# Patient Record
Sex: FEMALE | Race: WHITE | Employment: FULL TIME | URBAN - METROPOLITAN AREA
[De-identification: names, ages, dates, MRNs, and addresses within clinical notes are randomized per-mention and may not be internally consistent; named-entity substitution may affect disease eponyms.]

---

## 2017-11-20 LAB — HIV AG/AB: NONREACTIVE

## 2020-01-16 ENCOUNTER — OFFICE VISIT (OUTPATIENT)
Dept: INTERNAL MEDICINE CLINIC | Age: 35
End: 2020-01-16
Payer: COMMERCIAL

## 2020-01-16 VITALS
BODY MASS INDEX: 23.16 KG/M2 | RESPIRATION RATE: 14 BRPM | SYSTOLIC BLOOD PRESSURE: 110 MMHG | WEIGHT: 118 LBS | OXYGEN SATURATION: 98 % | HEART RATE: 99 BPM | DIASTOLIC BLOOD PRESSURE: 80 MMHG | HEIGHT: 60 IN

## 2020-01-16 PROCEDURE — 99385 PREV VISIT NEW AGE 18-39: CPT | Performed by: INTERNAL MEDICINE

## 2020-01-16 RX ORDER — NORGESTREL AND ETHINYL ESTRADIOL 0.3-0.03MG
1 KIT ORAL DAILY
COMMUNITY
Start: 2019-11-15 | End: 2021-04-05

## 2020-01-16 SDOH — HEALTH STABILITY: MENTAL HEALTH: HOW MANY STANDARD DRINKS CONTAINING ALCOHOL DO YOU HAVE ON A TYPICAL DAY?: 1 OR 2

## 2020-01-16 SDOH — HEALTH STABILITY: MENTAL HEALTH: HOW OFTEN DO YOU HAVE A DRINK CONTAINING ALCOHOL?: 2-3 TIMES A WEEK

## 2020-01-16 ASSESSMENT — ENCOUNTER SYMPTOMS
PHOTOPHOBIA: 0
VOMITING: 0
TROUBLE SWALLOWING: 0
NAUSEA: 0
SINUS PAIN: 0
BACK PAIN: 0
COLOR CHANGE: 0
SHORTNESS OF BREATH: 0
SINUS PRESSURE: 0
DIARRHEA: 0
EYE PAIN: 0
ABDOMINAL PAIN: 0
COUGH: 0
WHEEZING: 0
CONSTIPATION: 0

## 2020-01-16 ASSESSMENT — PATIENT HEALTH QUESTIONNAIRE - PHQ9
SUM OF ALL RESPONSES TO PHQ9 QUESTIONS 1 & 2: 0
SUM OF ALL RESPONSES TO PHQ QUESTIONS 1-9: 0
1. LITTLE INTEREST OR PLEASURE IN DOING THINGS: 0
2. FEELING DOWN, DEPRESSED OR HOPELESS: 0
SUM OF ALL RESPONSES TO PHQ QUESTIONS 1-9: 0

## 2020-01-16 NOTE — PROGRESS NOTES
History and Physical      Edel Mccallum  YOB: 1985    Date of Service:  1/16/2020    Vitals:    01/16/20 0811   BP: 110/80   Pulse: 99   Resp: 14   SpO2: 98%   Weight: 118 lb (53.5 kg)   Height: 5' 0.25\" (1.53 m)     Body mass index is 22.85 kg/m². Wt Readings from Last 3 Encounters:   01/16/20 118 lb (53.5 kg)     BP Readings from Last 3 Encounters:   01/16/20 110/80       Chief Complaint: Aixa Ornelas is a 29 y.o. female who presents for complete physical examination. HPI:     Feeling well  [employer] employee  Some depression last year when weaning her daughter - has significantly improved now. Did therapy. Has had thrombocytopenia of pregnancy. Did not improve postpartum this last time. There is no problem list on file for this patient. No Known Allergies  Outpatient Medications Marked as Taking for the 1/16/20 encounter (Office Visit) with Lana Martinez MD   Medication Sig Dispense Refill    LOW-OGESTREL 0.3-30 MG-MCG per tablet Take 1 tablet by mouth daily         Past Medical History:   Diagnosis Date    Chicken pox      History reviewed. No pertinent surgical history. History reviewed. No pertinent family history. Review of Systems:  Review of Systems   Constitutional: Negative for appetite change, fatigue, fever and unexpected weight change. No night sweats   HENT: Negative for congestion, ear pain, hearing loss, nosebleeds, sinus pressure, sinus pain, sneezing, tinnitus and trouble swallowing. Eyes: Negative for photophobia, pain and visual disturbance. Respiratory: Negative for cough, shortness of breath and wheezing. Cardiovascular: Negative for chest pain, palpitations and leg swelling. Gastrointestinal: Negative for abdominal pain, constipation, diarrhea, nausea and vomiting. Endocrine: Negative for cold intolerance, heat intolerance, polydipsia, polyphagia and polyuria.    Genitourinary: Negative for difficulty urinating, dysuria, frequency, hematuria and urgency. Musculoskeletal: Negative for arthralgias, back pain, joint swelling, myalgias and neck pain. Skin: Negative for color change and rash. Allergic/Immunologic: Negative for environmental allergies, food allergies and immunocompromised state. Neurological: Negative for dizziness, syncope, speech difficulty, weakness, light-headedness, numbness and headaches. Hematological: Negative for adenopathy. Does not bruise/bleed easily. Psychiatric/Behavioral: Negative for dysphoric mood and sleep disturbance. The patient is not nervous/anxious. Physical Exam  Constitutional:       General: She is not in acute distress. Appearance: Normal appearance. She is well-developed. She is not diaphoretic. HENT:      Head: Normocephalic and atraumatic. Right Ear: Tympanic membrane, ear canal and external ear normal.      Left Ear: Tympanic membrane, ear canal and external ear normal.      Nose: Nose normal.      Mouth/Throat:      Mouth: Mucous membranes are moist.      Pharynx: Oropharynx is clear. No oropharyngeal exudate or posterior oropharyngeal erythema. Eyes:      General: No scleral icterus. Conjunctiva/sclera: Conjunctivae normal.      Pupils: Pupils are equal, round, and reactive to light. Neck:      Musculoskeletal: Normal range of motion and neck supple. Thyroid: No thyromegaly. Vascular: No JVD. Comments: No carotid bruits  Cardiovascular:      Rate and Rhythm: Normal rate and regular rhythm. Pulses: Normal pulses. Heart sounds: Normal heart sounds. No murmur. No friction rub. No gallop. Pulmonary:      Effort: Pulmonary effort is normal.      Breath sounds: Normal breath sounds. No wheezing or rales. Abdominal:      General: Bowel sounds are normal.      Palpations: Abdomen is soft. Musculoskeletal: Normal range of motion. General: No tenderness or deformity. Right lower leg: No edema.       Left lower leg: No edema. Lymphadenopathy:      Cervical: No cervical adenopathy. Skin:     General: Skin is warm and dry. Findings: No lesion or rash. Neurological:      General: No focal deficit present. Mental Status: She is alert and oriented to person, place, and time. Coordination: Coordination normal.   Psychiatric:         Mood and Affect: Mood normal.         Behavior: Behavior normal.         Lipid panel: No results found for: CHOL, TRIG, HDL, LDLCALC, LDLDIRECT  Glucose: No results found for: GLUCOSE    No results found for this visit on 01/16/20.     Preventive Care:  Hx abnormal PAP: no  Self-breast exams: yes  Eye exam within the past 2 years: yes  Dental exam every 6 months: yes  Seatbelt used consistently: yes  Smoke and carbon monoxidedetectors in home: yes   Exercise: using a n exercise physical therapist  Social History     Socioeconomic History    Marital status: None     Spouse name: None    Number of children: None    Years of education: None    Highest education level: None   Occupational History    Occupation: works for [employer] resource strain: None    Food insecurity:     Worry: None     Inability: None    Transportation needs:     Medical: None     Non-medical: None   Tobacco Use    Smoking status: Never Smoker    Smokeless tobacco: Never Used   Substance and Sexual Activity    Alcohol use: Yes     Frequency: 2-3 times a week     Drinks per session: 1 or 2    Drug use: None    Sexual activity: None   Lifestyle    Physical activity:     Days per week: None     Minutes per session: None    Stress: None   Relationships    Social connections:     Talks on phone: None     Gets together: None     Attends [employer] service: None     Active member of club or organization: None     Attends meetings of clubs or organizations: None     Relationship status: None    Intimate partner violence:     Fear of current or ex partner: None     Emotionally abused: None     Physically abused: None     Forced sexual activity: None   Other Topics Concern    None   Social History Narrative    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 Diagnoses and all orders for this visit:    Annual physical exam  Encourage continued exercise and healthy diet  UTD on IMZ  UTD on cancer screenings  Screen for lipids and DM. Check platelets for stability    Screening for diabetes mellitus (DM)  -     Hemoglobin A1C; Future    Encounter for lipid screening for cardiovascular disease  -     Lipid Panel;  Future    Thrombocytopenia (HCC)  -     CBC Auto Differential; Future          Return in about 1 year (around 1/16/2021) for Physical.

## 2020-08-04 ENCOUNTER — EMPLOYEE WELLNESS (OUTPATIENT)
Dept: OTHER | Age: 35
End: 2020-08-04

## 2020-08-04 LAB
CHOLESTEROL, TOTAL: 154 MG/DL (ref 0–199)
GLUCOSE BLD-MCNC: 80 MG/DL (ref 70–99)
HDLC SERPL-MCNC: 44 MG/DL (ref 40–60)
LDL CHOLESTEROL CALCULATED: 66 MG/DL
TRIGL SERPL-MCNC: 220 MG/DL (ref 0–150)

## 2020-08-19 ENCOUNTER — NURSE TRIAGE (OUTPATIENT)
Dept: OTHER | Facility: CLINIC | Age: 35
End: 2020-08-19

## 2020-08-19 ENCOUNTER — TELEPHONE (OUTPATIENT)
Dept: INTERNAL MEDICINE CLINIC | Age: 35
End: 2020-08-19

## 2020-08-19 NOTE — TELEPHONE ENCOUNTER
Reason for Disposition   MILD rectal bleeding (more than just a few drops or streaks)    Answer Assessment - Initial Assessment Questions  1. APPEARANCE of BLOOD: \"What color is it? \" \"Is it passed separately, on the surface of the stool, or mixed in with the stool? \"       Blood when whiping  2. AMOUNT: \"How much blood was passed? \"       small  3. FREQUENCY: \"How many times has blood been passed with the stools? \"   When constipated  4. ONSET: \"When was the blood first seen in the stools? \" (Days or weeks)       Since March  5. DIARRHEA: \"Is there also some diarrhea? \" If so, ask: \"How many diarrhea no  6. CONSTIPATION: \"Do you have constipation? \" If so, \"How bad is it? \"  yes  7. RECURRENT SYMPTOMS: \"Have you had blood in your stools before? \" If so, ask: \"When was the last time? \" and \"What happened that time? \"      yes  8. BLOOD THINNERS: \"Do you take any blood thinners? \" (e.g., Coumadin/warfarin, Pradaxa/dabigatran, aspirin)   no  9. OTHER SYMPTOMS: \"Do you have any other symptoms? \"  (e.g., abdominal pain, vomiting, dizziness, fever)  Bloating, abd pressure  10. PREGNANCY: \"Is there any chance you are pregnant? \" \"When was your last menstrual period? \"    No, cycle this week    Protocols used: RECTAL BLEEDING-ADULT-[place]    Call transferred to [facility]

## 2020-08-19 NOTE — TELEPHONE ENCOUNTER
----- Message from Ozie Cushing sent at 8/19/2020 12:36 PM EDT -----  Subject: Message to Provider    QUESTIONS  Information for Provider? Pt return from triage; to be seen in next 3   days; abdominal bleeding   blood in stool; Prefers early morning 8/20 or around lunch time 8/21  ---------------------------------------------------------------------------  --------------  CALL BACK INFO  What is the best way for the office to contact you? OK to leave message on   voicemail  Preferred Call Back Phone Number? [phone]  ---------------------------------------------------------------------------  --------------  SCRIPT ANSWERS  Relationship to Patient?  Self

## 2020-08-19 NOTE — TELEPHONE ENCOUNTER
Reason for Disposition   Caller has already spoken with another triager and has no further questions    Protocols used: NO CONTACT OR DUPLICATE CONTACT CALL-ADULT-[place]    Disconnected from patient - patient called back and spoke with a different Triage RN.

## 2020-08-19 NOTE — TELEPHONE ENCOUNTER
Pt scheduled. Kellie Tabares spoke with patient who states she has had these sx since March. They come and go. Weird abdominal pain ok with waiting till first available with Dr Giovanna Thurman which is Monday.  Patient scheduled

## 2020-08-19 NOTE — TELEPHONE ENCOUNTER
Left pt message to call back. Need additional information. Blood when wiping? Blood in stool or toilet? How long? Any pain when having bowel movement? Abdominal pain? Where is it located? Constipation? Bright blood or dark red?

## 2020-08-24 ENCOUNTER — OFFICE VISIT (OUTPATIENT)
Dept: INTERNAL MEDICINE CLINIC | Age: 35
End: 2020-08-24
Payer: COMMERCIAL

## 2020-08-24 VITALS
TEMPERATURE: 98.7 F | RESPIRATION RATE: 14 BRPM | SYSTOLIC BLOOD PRESSURE: 92 MMHG | DIASTOLIC BLOOD PRESSURE: 66 MMHG | HEART RATE: 83 BPM | OXYGEN SATURATION: 98 %

## 2020-08-24 LAB
BILIRUBIN URINE: NEGATIVE
BLOOD, URINE: NEGATIVE
CLARITY: CLEAR
COLOR: YELLOW
CONTROL: NORMAL
EPITHELIAL CELLS, UA: 1 /HPF (ref 0–5)
GLUCOSE URINE: NEGATIVE MG/DL
HYALINE CASTS: 0 /LPF (ref 0–8)
KETONES, URINE: NEGATIVE MG/DL
LEUKOCYTE ESTERASE, URINE: NEGATIVE
MICROSCOPIC EXAMINATION: NORMAL
NITRITE, URINE: NEGATIVE
PH UA: 7.5 (ref 5–8)
PREGNANCY TEST URINE, POC: NEGATIVE
PROTEIN UA: NEGATIVE MG/DL
RBC UA: 1 /HPF (ref 0–4)
SPECIFIC GRAVITY UA: 1.01 (ref 1–1.03)
URINE TYPE: NORMAL
UROBILINOGEN, URINE: 0.2 E.U./DL
WBC UA: 0 /HPF (ref 0–5)

## 2020-08-24 PROCEDURE — G8420 CALC BMI NORM PARAMETERS: HCPCS | Performed by: INTERNAL MEDICINE

## 2020-08-24 PROCEDURE — 99214 OFFICE O/P EST MOD 30 MIN: CPT | Performed by: INTERNAL MEDICINE

## 2020-08-24 PROCEDURE — 1036F TOBACCO NON-USER: CPT | Performed by: INTERNAL MEDICINE

## 2020-08-24 PROCEDURE — 81025 URINE PREGNANCY TEST: CPT | Performed by: INTERNAL MEDICINE

## 2020-08-24 PROCEDURE — G8427 DOCREV CUR MEDS BY ELIG CLIN: HCPCS | Performed by: INTERNAL MEDICINE

## 2020-08-24 ASSESSMENT — ENCOUNTER SYMPTOMS
VOMITING: 0
ABDOMINAL PAIN: 1
BLOOD IN STOOL: 0
NAUSEA: 0
ABDOMINAL DISTENTION: 1
DIARRHEA: 0
CONSTIPATION: 1
RECTAL PAIN: 1
ANAL BLEEDING: 1

## 2020-08-24 NOTE — PROGRESS NOTES
[facility]  Acute Visit  Shima Fajardo MD      Fani Castrejon is a 28 y.o. female who presents with   Chief Complaint   Patient presents with    Abdominal Pain     right sided discomfort, has a hx of Hemorrhoids, bloating feeling in right sided lower abdomin. Only blood when Hemorrhoids are acting up. Under a lot of stress    . HPI    Since March has had intermittent bloating on the right side. Mild discomfort. Notices more when lays down on back or front. 4 kids. Stools are dark brown and happening every 3 days - then will have 3 stools in day. Takes a little bit of fiber supplement each day. Always hemorrhoids - has some redness on toilet paper. Does not think. Have been worsening. Does not take stool softener. Has had negative pregnancy tests as well. Weight has been steady. In July went up on OCP dose because of breakthrough bleeding. No fevers or chills. Feels some suprapubic pressure but no pain. Review of Systems   Constitutional: Negative for chills, fatigue, fever and unexpected weight change. Gastrointestinal: Positive for abdominal distention, abdominal pain, anal bleeding, constipation and rectal pain. Negative for blood in stool, diarrhea, nausea and vomiting. Genitourinary: Positive for menstrual problem (spotting). Skin: Negative for rash. Past Medical History:   Diagnosis Date    Chicken pox        History reviewed. No pertinent surgical history.     Social History     Socioeconomic History    Marital status:      Spouse name: Not on file    Number of children: Not on file    Years of education: Not on file    Highest education level: Not on file   Occupational History    Occupation: works for [employer] Financial resource strain: Not on file    Food insecurity     Worry: Not on file     Inability: Not on file   [employer] needs     Medical: Not on file     Non-medical: Not on file   Tobacco Use    Smoking status: Never Smoker    Smokeless tobacco: Never Used   Substance and Sexual Activity    Alcohol use: Yes     Frequency: 2-3 times a week     Drinks per session: 1 or 2    Drug use: Not on file    Sexual activity: Not on file   Lifestyle    Physical activity     Days per week: Not on file     Minutes per session: Not on file    Stress: Not on file   Relationships    Social connections     Talks on phone: Not on file     Gets together: Not on file     Attends [employer] service: Not on file     Active member of club or organization: Not on file     Attends meetings of clubs or organizations: Not on file     Relationship status: Not on file    Intimate partner violence     Fear of current or ex partner: Not on file     Emotionally abused: Not on file     Physically abused: Not on file     Forced sexual activity: Not on file   Other Topics Concern    Not on file   Social History Narrative    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 No Known Allergies      BP 92/66   Pulse 83   Temp 98.7 °F (37.1 °C) (Temporal)   Resp 14   LMP 08/17/2020   SpO2 98%   Physical Exam  Constitutional:       General: She is not in acute distress. Appearance: Normal appearance. She is not diaphoretic. HENT:      Head: Normocephalic and atraumatic. Right Ear: External ear normal.      Left Ear: External ear normal.      Nose: Nose normal.      Mouth/Throat:      Mouth: Mucous membranes are moist.      Pharynx: Oropharynx is clear. Eyes:      General: No scleral icterus. Conjunctiva/sclera: Conjunctivae normal.   Neck:      Musculoskeletal: Normal range of motion. Cardiovascular:      Rate and Rhythm: Normal rate and regular rhythm. Pulses: Normal pulses.       Heart sounds: Normal heart sounds. No murmur. No friction rub. No gallop. Pulmonary:      Effort: Pulmonary effort is normal.      Breath sounds: Normal breath sounds. No wheezing, rhonchi or rales. Abdominal:      General: Abdomen is flat. Bowel sounds are normal. There is no distension. Palpations: Abdomen is soft. There is no mass. Tenderness: There is abdominal tenderness (mild RLQ). There is no right CVA tenderness, left CVA tenderness, guarding or rebound. Hernia: No hernia is present. Genitourinary:     Comments: 2 internal hemorrhoids at 10 oclock and 12 oclock - no prolapse  No blood noted  Brown stool in rectal vault  Musculoskeletal:         General: No deformity. Right lower leg: No edema. Left lower leg: No edema. Skin:     General: Skin is warm and dry. Findings: No rash. Neurological:      General: No focal deficit present. Mental Status: She is alert. Coordination: Coordination normal.      Gait: Gait normal.   Psychiatric:         Mood and Affect: Mood normal.         Behavior: Behavior normal.           Assessment/Plan:  [facility] was seen today for abdominal pain. Diagnoses and all orders for this visit:    Right lower quadrant abdominal pain  GI vs  vs gyn. Will get HCG, UA/Cx and labs. Given rectal bleeding (likely hemorrhoids but still) will need to proceed with further work up. Start with CT imaging and pending those results consider colonoscopy. Continue fiber supplement. Add stool softener to increase BM frequency. -     POCT urine pregnancy  -     URINALYSIS WITH MICROSCOPIC  -     Culture, Urine  -     CBC Auto Differential; Future  -     COMPREHENSIVE METABOLIC PANEL; Future  -     SEDIMENTATION RATE; Future  -     TSH with Reflex; Future  -     CT ABDOMEN PELVIS W IV CONTRAST Additional Contrast? Radiologist Recommendation; Future  -     C-Reactive Protein;  Future    Rectal bleeding  -     POCT urine pregnancy  -     URINALYSIS WITH MICROSCOPIC  - Culture, Urine  -     CBC Auto Differential; Future  -     COMPREHENSIVE METABOLIC PANEL; Future  -     SEDIMENTATION RATE; Future  -     TSH with Reflex; Future  -     CT ABDOMEN PELVIS W IV CONTRAST Additional Contrast? Radiologist Recommendation; Future  -     C-Reactive Protein; Future          Return if symptoms worsen or fail to improve.

## 2020-08-25 LAB — URINE CULTURE, ROUTINE: NORMAL

## 2020-09-03 ENCOUNTER — HOSPITAL ENCOUNTER (OUTPATIENT)
Dept: CT IMAGING | Age: 35
Discharge: HOME | End: 2020-09-03
Payer: COMMERCIAL

## 2020-09-03 PROCEDURE — 74177 CT ABD & PELVIS W/CONTRAST: CPT

## 2020-09-03 PROCEDURE — 6360000004 HC RX CONTRAST MEDICATION: Performed by: INTERNAL MEDICINE

## 2020-09-03 RX ADMIN — IOPAMIDOL 80 ML: 755 INJECTION, SOLUTION INTRAVENOUS at 10:58

## 2020-09-03 RX ADMIN — IOHEXOL 50 ML: 240 INJECTION, SOLUTION INTRATHECAL; INTRAVASCULAR; INTRAVENOUS; ORAL at 10:58

## 2020-09-30 ENCOUNTER — INITIAL CONSULT (OUTPATIENT)
Dept: GASTROENTEROLOGY | Age: 35
End: 2020-09-30
Payer: COMMERCIAL

## 2020-09-30 VITALS
WEIGHT: 122.2 LBS | SYSTOLIC BLOOD PRESSURE: 120 MMHG | BODY MASS INDEX: 23.99 KG/M2 | DIASTOLIC BLOOD PRESSURE: 91 MMHG | TEMPERATURE: 98.2 F | HEIGHT: 60 IN | HEART RATE: 81 BPM

## 2020-09-30 PROCEDURE — 99202 OFFICE O/P NEW SF 15 MIN: CPT | Performed by: INTERNAL MEDICINE

## 2020-09-30 NOTE — PROGRESS NOTES
[address] [facility],  [address]  Phone: [phone]   ZQY:[phone]        Paz Mccallum She is a  [2] White [1] 28 y.o. Rema  female      [facility]   The patient is here for Bleeding per rectum. Chief Complaint   Patient presents with    New Patient     bloating, ruq discomfort, has hemorroids        HPI      The patient has been diagnosed with irritable bowel syndrome years ago and has been having intermittent abdominal bloating discomfort and recently she has had streaks of blood coating stool from outside. She has not had any fever or diarrhea. She has not noticed any significant change in her bowel habits, weight or appetite. She is of the opinion that the bleeding was from hemorrhoids. PAST MEDICAL HISTORY     Past Medical History:   Diagnosis Date    Chicken pox      FAMILY HISTORY     Family History   Problem Relation Age of Onset    Cancer Mother         lymphoma    Rectal Cancer Brother     Rectal Cancer Paternal Grandfather      Brother has HIV and HPV. Colon poylps, but no cancer.     SOCIAL HISTORY     Social History     Socioeconomic History    Marital status:      Spouse name: Not on file    Number of children: Not on file    Years of education: Not on file    Highest education level: Not on file   Occupational History    Occupation: works for [address] [place] [address] Financial resource strain: Not on file    Food insecurity     Worry: Not on file     Inability: Not on file   [employer] needs     Medical: Not on file     Non-medical: Not on file   Tobacco Use    Smoking status: Never Smoker    Smokeless tobacco: Never Used   Substance and Sexual Activity    Alcohol use: Yes     Frequency: 2-3 times a week     Drinks per session: 1 or 2    Drug use: Not on file    Sexual activity: Yes     Partners: Male     Birth control/protection: Pill   Lifestyle    Physical activity     Days per week: Not on file     Minutes per session: Not on file    Stress: Not on file   Relationships    Social connections     Talks on phone: Not on file     Gets together: Not on file     Attends Spiritism service: Not on file     Active member of club or organization: Not on file     Attends meetings of clubs or organizations: Not on file     Relationship status: Not on file    Intimate partner violence     Fear of current or ex partner: Not on file     Emotionally abused: Not on file     Physically abused: Not on file     Forced sexual activity: Not on file   Other Topics Concern    Not on file   Social History Narrative    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 CURRENT MEDICATIONS   (This list may include medications prescribed during this encounter as epic can not insert only the list prior to this encounter.)  Current Outpatient Rx   Medication Sig Dispense Refill    LOW-OGESTREL 0.3-30 MG-MCG per tablet Take 1 tablet by mouth daily         ALLERGIES   No Known Allergies    REVIEW OF SYSTEMS   No chest pain suspicious for cardiac origin  No SOB    PHYSICAL EXAM   VITAL SIGNS: BP (!) 120/91 (Site: Left Lower Arm)   Pulse 81   Temp 98.2 °F (36.8 °C)   Ht 5' (1.524 m)   Wt 122 lb 3.2 oz (55.4 kg)   BMI 23.87 kg/m²   Wt Readings from Last 1 Encounters:   09/30/20 122 lb 3.2 oz (55.4 kg)     Constitutional: Well developed, Well nourished, No acute distress, Non-toxic appearance. Heart: WNL  Lungs: Clear to A&P  Abd: soft, non-tender, no masses are felt. Neurologic: Alert & oriented x 3. Psych: Good mood and memory. Pt is able to make appropriate decisions. FINAL IMPRESSION AND RECOMMENDATIONS     The patient eats relatively healthy diet with the exception of having a significant quantity of red meat. She is very well-informed and has had a very healthy lifestyle. Irritable bowel syndrome related symptoms is not a concern at this time for her.     The patient does have a history suggestive of irritable bowel syndrome. The blood noticed on stool is most consistent with hemorrhoidal bleeding. However, the possibility of colorectal neoplasia cannot completely be ruled out. The patient is from a family of doctors. She is very knowledgeable about the practice of medicine and test which are performed for a variety of different reasons. She has difficulty excepting that when a particular diagnosis is suspected strongly why any testing has to be done to rule out much lower probability conditions. I explained to her the concern amongst physicians secondary to reports of onset of colon cancer in younger and younger individuals in this country. The recommendations usually lag behind level wait at least.  But even right now they have been revised to begin colon cancer screening at the age of 39. She was interested in knowing more about the possibility of finding a significant problem by doing any kind of evaluation of her colon. I explained to her that I understand her logic about getting the actual probability number, but that number keeps changing and therefore she is not going to find an exact number by which 1 can go by. I did mention to her that during my professional career the method of colon cancer screening changed because we realized the short exam we used to do in the past was not a good way of reducing colon cancer incidence. Even if she does not want a colonoscopy, I urged her to consider at the least a sigmoidoscopy. I shared with her my opinion that an anatomic evaluation of colon by way of CT colonography is also a reasonable additional test which she can consider. I repeatedly emphasized the fact that having some evaluation of the lower part of the colon with her history has been a recommendation for decades and is a medel rule in my opinion.   Not following this recommendation may lead to missing important lesions at least in a minority of the patients-polyps, cancer, etc.  Therefore, not having any evaluation comes with the increased risk of missing some important disease, even though the actual percentage may be very low. Ultimately she is the one who has to decide what she feels comfortable with. Near the end of the visit the patient informed me that she wants to digest everything and let us know which way she wants to go. ORDERED FUTURE/PENDING TESTS     Lab Frequency Next Occurrence   CT CHEST WO CONTRAST Once 09/03/2020       FOLLOWUP   No follow-ups on file. The total time spent face-to-face during this visit was 25 minutes with more than 50% of the time spent in coordination of care and counseling the patient about the medical conditions and their management. Prosper Newell 9/30/20 1:29 PM EDT    CC:  Maynor Chapa MD      IMPORTANT: Please note that some portions of this note may have been created using Dragon voice recognition software. Some \"sound-alike\" and totally wrong word substitutions may have taken place due to known inherent limitations of any such software, including this voice recognition software. In spite of efforts to eliminate such errors, some may not have been corrected. So please read the note with this in mind and recognize such mistakes and understand the correct version using the  context. Thanks.

## 2020-10-19 VITALS — WEIGHT: 119 LBS | BODY MASS INDEX: 23.05 KG/M2

## 2020-12-07 ENCOUNTER — TELEPHONE (OUTPATIENT)
Dept: CASE MANAGEMENT | Age: 35
End: 2020-12-07

## 2020-12-07 NOTE — TELEPHONE ENCOUNTER
Incidental pulmonary nodule noted on CT Ab/Pelvis 09/03/2020. Patient has not scheduled CT Chest as ordered by PCP. Called patient to help arrange appointment-no answer-left voicemail.     Srini HOLCOMBN, RN  [facility]  Lung Nodule Navigator  [phone]

## 2021-03-15 ENCOUNTER — VIRTUAL VISIT (OUTPATIENT)
Dept: GASTROENTEROLOGY | Age: 36
End: 2021-03-15
Payer: COMMERCIAL

## 2021-03-15 DIAGNOSIS — R10.9 ABDOMINAL PAIN, UNSPECIFIED ABDOMINAL LOCATION: ICD-10-CM

## 2021-03-15 DIAGNOSIS — K92.1 BLOOD IN STOOL: Primary | ICD-10-CM

## 2021-03-15 PROCEDURE — 99212 OFFICE O/P EST SF 10 MIN: CPT | Performed by: INTERNAL MEDICINE

## 2021-03-15 NOTE — PROGRESS NOTES
[address] [facility],  [address] [place] [address] Ave  [place], [address] [place] [address]  Phone: [phone]    Thank you Fredo Blair MD for asking me to see Serjio Mccallum in consultation. Services were provided through a video synchronous discussion virtually to substitute for in-person clinic visit. Patient was located at home and provider was located in office or at home and requested the medical evaluation. Edel Mccallum She is a  [2] White [1] 28 y.o. Aisha Libman female        CHIEF COMPLAINT     Blood in stool. Chief Complaint   Patient presents with    Follow-up     rectal bleeding comes and goes. thinks there hemorroids. painful (text [phone])       HPI   The patient was seen in September last year for bloating right upper quadrant discomfort and bleeding per rectum suggestive of hemorrhoidal bleeding. She has family history of colorectal neoplasia. She decided to wait before proceeding with any procedure. Now she has decided to proceed with the endoscopic work-up. Review of available records reveals:   Wt Readings from Last 50 Encounters:   09/30/20 122 lb 3.2 oz (55.4 kg)   08/04/20 119 lb (54 kg)   01/16/20 118 lb (53.5 kg)       No components found for: HGBA1C  BP Readings from Last 3 Encounters:   09/30/20 (!) 120/91   08/24/20 92/66   01/16/20 110/80     Health Maintenance   Topic Date Due    Hepatitis C screen  Never done    Varicella vaccine (1 of 2 - 2-dose childhood series) Never done    Cervical cancer screen  02/05/2022    DTaP/Tdap/Td vaccine (3 - Td) 04/12/2028    Flu vaccine  Completed    HIV screen  Completed    Hepatitis A vaccine  Aged Out    Hepatitis B vaccine  Aged Out    Hib vaccine  Aged Out    Meningococcal (ACWY) vaccine  Aged Out    Pneumococcal 0-64 years Vaccine  Aged Out       No components found for: [facility]     PAST MEDICAL HISTORY     Past Medical History:   Diagnosis Date    Chicken pox      FAMILY HISTORY LOW-OGESTREL 0.3-30 MG-MCG per tablet Take 1 tablet by mouth daily         ALLERGIES   No Known Allergies  IMMUNIZATIONS     Immunization History   Administered Date(s) Administered    Influenza Virus Vaccine 11/01/2019, 10/08/2020    Tdap (Boostrix, Adacel) 08/15/2016, 04/12/2018         PHYSICAL EXAM   VITAL SIGNS: There were no vitals taken for this visit. Wt Readings from Last 3 Encounters:   09/30/20 122 lb 3.2 oz (55.4 kg)   08/04/20 119 lb (54 kg)   01/16/20 118 lb (53.5 kg)     Constitutional: Well developed, Well nourished, No acute distress, Non-toxic appearance. Neurologic: Alert & oriented x 3. Psych: Good mood and memory. Pt is able to make appropriate decisions. FINAL IMPRESSION     Orders Placed This Encounter   Procedures    COLONOSCOPY W/ OR W/O BIOPSY     Scheduling Instructions:      Schedule with anesthesia provided diprivan. Please provide prep of choice instructions and prescription. General guidelines for holding blood thinners/anticoagulants around endoscopic procedure are but patients are encouraged to check with their prescribing physician. The patient may hold Plavix, Effient, Brilinta 5 days prior to the procedure unless:       A drug eluting stent has been placed within past 12 months. A nondrug eluting stent has been placed within past 1 month. Coumadin may be held 4 days prior to the procedure unless:        Mechanical mitral valve replacement (requires heparin bridge while Coumadin held and is managed by pharmacy)      Pradaxa, Xarelto, Eliquis may be held 2-3 days prior to procedure. According to pharmacokinetics of the drug, package insert, cardiology practice patterns, and T1/2 of theses drugs (12 hrs), Eliquis and Xarelto are held 48hrs prior to any procedure, including major surgical procedures w/o       increased bleeding.  That is usually the standard of care, as coagulation would/should be normalized at 48hrs. Every attempt should be made to maintain ASA 81mg per day throughout the tomas-operative period in patients with diagnosis of ASHD. These recommendations may need to be modified by the provider/ based on risk /benefit analysis of the procedure and the patients history. If anticoagulation can not be held because recent cardiac stent, elective endoscopic procedures should be delayed until they have received the minimum duration of recommended antiplatlet therapy and it can safely be held. Again if unsure, patient should discuss with prescribing physician/service. If anticoagulation can not be stopped, endoscopic procedures can still be performed either diagnostically at a somewhat higher risk. Understand that any therapeutic procedure where anything beyond looking is performed, carries higher risks. For this reason without overt bleeding other testing       such as cologuard may be more appropriate. High risk endoscopic procedures that require stopping antiplatelet and anticoagulation therapy include polypectomy, biliary or pancreatic sphincterotomy, pneumatic or bougie dilation, PEG placement, therapeutic balloon-assisted enteroscopy, EUS and FNA, tumor ablation by any technique,       cystogastrostomy,and treatment of varices. Order Specific Question:   Screening or Diagnostic? Answer:   Diagnostic     Comments:   2-day     Memo Edmonds was seen today for follow-up. Diagnoses and all orders for this visit:    Blood in stool  -     COLONOSCOPY W/ OR W/O BIOPSY    Abdominal pain, unspecified abdominal location  -     COLONOSCOPY W/ OR W/O BIOPSY    IMPRESSION and MANAGEMENT    The patient was explained the management options for possible hemorrhoidal and other sources of bleeding per rectum. After discussion we decided to go ahead with a colonoscopy for the full evaluation of the entire colon.   Because of the irregular bowel movements and spasm, we decided to go ahead with 2 days of preparation for the sake of safety. The patient has been explained the risks and benefits of colonoscopy with biopsy, polypectomy and other interventions as needed. The risks explained to the patient included, but were not limited to, bleeding, perforation, infection, suppression of breathing, cardiac arrhythmias, heart attack, stroke, need for surgery or hospitalization and remotely even death. The alternatives to colonoscopy including CT colonography were discussed with pros and cons. The patient is explained that compared to other alternatives to colonoscopy for evaluation of the colon, colonoscopy was the most accurate test as long as the preparation is adequate. The patient is also explained that in some cases colonoscopy cannot be completed because of a variety of reasons including poor prep. The prognosis in case colonoscopy is not done was also explained. The complication rate is 0.3 to 1.5% in general when minor and major complications are put together. The prognosis is explained if the procedure is not done. The patient voiced understanding of what was discussed. The patient was given opportunity to ask questions. No question was left unanswered. The patient expressed the desire to go ahead with the colonoscopy and gave informed consent saying, 'I want to go ahead with the colonoscopy'. The total time spent face-to-face during this visit and before and after the visit was  11  Minutes. CC:  Jayy Castro MD      IMPORTANT: Please note that some portions of this note may have been created using Dragon voice recognition software. Some \"sound-alike\" and totally wrong word substitutions may have taken place due to known inherent limitations of any such software, including this voice recognition software. In spite of efforts to eliminate such errors, some may not have been corrected.  So please read the note with this in mind and recognize such mistakes and understand the correct version using the  context. Thanks.

## 2021-03-17 DIAGNOSIS — K92.1 BLOOD IN STOOL: Primary | ICD-10-CM

## 2021-03-17 DIAGNOSIS — R10.9 ABDOMINAL PAIN, UNSPECIFIED ABDOMINAL LOCATION: ICD-10-CM

## 2021-03-18 RX ORDER — POLYETHYLENE GLYCOL 3350 17 G/17G
POWDER, FOR SOLUTION ORAL
Qty: 238 G | Refills: 1 | Status: SHIPPED | OUTPATIENT
Start: 2021-03-18 | End: 2021-11-04 | Stop reason: ALTCHOICE

## 2021-03-18 RX ORDER — BISACODYL 5 MG
TABLET, DELAYED RELEASE (ENTERIC COATED) ORAL
Qty: 4 TABLET | Refills: 0 | Status: SHIPPED | OUTPATIENT
Start: 2021-03-18 | End: 2021-11-04 | Stop reason: ALTCHOICE

## 2021-04-05 ENCOUNTER — OFFICE VISIT (OUTPATIENT)
Dept: GYNECOLOGY | Age: 36
End: 2021-04-05
Payer: COMMERCIAL

## 2021-04-05 VITALS
RESPIRATION RATE: 17 BRPM | HEART RATE: 70 BPM | TEMPERATURE: 98 F | HEIGHT: 60 IN | DIASTOLIC BLOOD PRESSURE: 66 MMHG | BODY MASS INDEX: 23.87 KG/M2 | SYSTOLIC BLOOD PRESSURE: 98 MMHG | WEIGHT: 121.6 LBS

## 2021-04-05 DIAGNOSIS — Z01.419 WELL WOMAN EXAM WITH ROUTINE GYNECOLOGICAL EXAM: Primary | ICD-10-CM

## 2021-04-05 PROCEDURE — 99385 PREV VISIT NEW AGE 18-39: CPT | Performed by: OBSTETRICS & GYNECOLOGY

## 2021-04-05 RX ORDER — NORETHINDRONE ACETATE AND ETHINYL ESTRADIOL AND FERROUS FUMARATE 1.5-30(21)
1.5-3 KIT ORAL DAILY
COMMUNITY
Start: 2021-03-30 | End: 2021-04-05 | Stop reason: SDUPTHER

## 2021-04-05 RX ORDER — NORETHINDRONE ACETATE AND ETHINYL ESTRADIOL 1.5-30(21)
1.5-3 KIT ORAL DAILY
Qty: 3 PACKET | Refills: 1 | Status: SHIPPED | OUTPATIENT
Start: 2021-04-05 | End: 2021-04-07 | Stop reason: SDUPTHER

## 2021-04-06 LAB
C TRACH DNA GENITAL QL NAA+PROBE: NEGATIVE
N. GONORRHOEAE DNA: NEGATIVE

## 2021-04-07 ENCOUNTER — IMMUNIZATION (OUTPATIENT)
Dept: PRIMARY CARE CLINIC | Age: 36
End: 2021-04-07
Payer: COMMERCIAL

## 2021-04-07 ENCOUNTER — TELEPHONE (OUTPATIENT)
Dept: GYNECOLOGY | Age: 36
End: 2021-04-07

## 2021-04-07 PROCEDURE — 0001A COVID-19, PFIZER VACCINE 30MCG/0.3ML DOSE: CPT | Performed by: FAMILY MEDICINE

## 2021-04-07 PROCEDURE — 91300 COVID-19, PFIZER VACCINE 30MCG/0.3ML DOSE: CPT | Performed by: FAMILY MEDICINE

## 2021-04-07 RX ORDER — NORETHINDRONE ACETATE AND ETHINYL ESTRADIOL 1.5-30(21)
1 KIT ORAL DAILY
Qty: 3 PACKET | Refills: 3 | Status: SHIPPED | OUTPATIENT
Start: 2021-04-07 | End: 2021-11-04 | Stop reason: ALTCHOICE

## 2021-04-07 NOTE — TELEPHONE ENCOUNTER
Pharmacist  Lacie Moreno called into office  regarding to patients script in regarding to her birth control Aurovela the directions stated patient is to take 2-30 tablets a day. Pharmacy phone number is [phone].

## 2021-04-11 ASSESSMENT — ENCOUNTER SYMPTOMS
GASTROINTESTINAL NEGATIVE: 1
RESPIRATORY NEGATIVE: 1
EYES NEGATIVE: 1

## 2021-04-11 NOTE — PROGRESS NOTES
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[employer]    4 kids     No Known Allergies  Outpatient Medications Marked as Taking for the 4/5/21 encounter (Office Visit) with Yolette Castaneda MD   Medication Sig Dispense Refill    [DISCONTINUED] norethindrone-ethinyl estradiol-iron (Yesenia Jackson FE 1.5/30) 1.5-30 MG-MCG tablet Take 2-30 tablets by mouth daily 3 packet 1     Family History   Problem Relation Age of Onset    Cancer Mother         lymphoma    Rectal Cancer Brother     Rectal Cancer Paternal Grandfather      BP 98/66 (Site: Right Upper Arm, Position: Sitting, Cuff Size: Large Adult)   Pulse 70   Temp 98 °F (36.7 °C)   Resp 17   Ht 5' (1.524 m)   Wt 121 lb 9.6 oz (55.2 kg)   LMP 03/30/2021   BMI 23.75 kg/m²       Objective:   Physical Exam  Constitutional:       Appearance: Normal appearance. She is well-developed and normal weight. HENT:      Head: Normocephalic. Nose: Nose normal.      Mouth/Throat:      Mouth: Mucous membranes are moist.      Pharynx: Oropharynx is clear. Eyes:      Pupils: Pupils are equal, round, and reactive to light. Neck:      Musculoskeletal: Normal range of motion and neck supple. No neck rigidity. Thyroid: No thyromegaly. Cardiovascular:      Rate and Rhythm: Normal rate and regular rhythm. Pulses: Normal pulses. Heart sounds: Normal heart sounds. No murmur. No friction rub. No gallop. Pulmonary:      Effort: Pulmonary effort is normal. No respiratory distress. Breath sounds: Normal breath sounds. No stridor. No wheezing, rhonchi or rales. Chest:      Chest wall: No tenderness. Breasts:         Right: Normal. No swelling, bleeding, inverted nipple, mass, nipple discharge, skin change or tenderness. Left: Normal. No swelling, bleeding, inverted nipple, mass, nipple discharge, skin change or tenderness. Abdominal:      General: Bowel sounds are normal. There is no distension. Palpations: Abdomen is soft. There is no mass. Tenderness: There is no abdominal tenderness. There is no guarding or rebound. Hernia: No hernia is present. There is no hernia in the left inguinal area. Genitourinary:     General: Normal vulva. Exam position: Lithotomy position. Pubic Area: No rash. Labia:         Right: No rash, tenderness, lesion or injury. Left: No rash, tenderness, lesion or injury. Urethra: No prolapse, urethral pain, urethral swelling or urethral lesion. Vagina: No signs of injury and foreign body. No vaginal discharge, erythema, tenderness, bleeding, lesions or prolapsed vaginal walls. Cervix: No cervical motion tenderness, discharge, friability, lesion, erythema, cervical bleeding or eversion. Uterus: Not deviated, not enlarged, not fixed and not tender. Adnexa:         Right: No mass, tenderness or fullness. Left: No mass, tenderness or fullness. Rectum: No anal fissure or external hemorrhoid. Comments: Normal urethral meatus, normal urethra, nl bladder  Musculoskeletal: Normal range of motion. General: No tenderness. Lymphadenopathy:      Cervical: No cervical adenopathy. Lower Body: No right inguinal adenopathy. No left inguinal adenopathy. Skin:     General: Skin is warm and dry. Coloration: Skin is not pale. Findings: No erythema or rash.    Neurological:

## 2021-04-28 ENCOUNTER — IMMUNIZATION (OUTPATIENT)
Dept: PRIMARY CARE CLINIC | Age: 36
End: 2021-04-28
Payer: COMMERCIAL

## 2021-04-28 PROCEDURE — 0002A COVID-19, PFIZER VACCINE 30MCG/0.3ML DOSE: CPT | Performed by: FAMILY MEDICINE

## 2021-04-28 PROCEDURE — 91300 COVID-19, PFIZER VACCINE 30MCG/0.3ML DOSE: CPT | Performed by: FAMILY MEDICINE

## 2021-05-04 ENCOUNTER — TELEPHONE (OUTPATIENT)
Dept: GASTROENTEROLOGY | Age: 36
End: 2021-05-04

## 2021-05-25 ENCOUNTER — OFFICE VISIT (OUTPATIENT)
Dept: DERMATOLOGY | Age: 36
End: 2021-05-25
Payer: COMMERCIAL

## 2021-05-25 VITALS — TEMPERATURE: 99 F

## 2021-05-25 DIAGNOSIS — D48.9 NEOPLASM OF UNCERTAIN BEHAVIOR: Primary | ICD-10-CM

## 2021-05-25 DIAGNOSIS — D22.9 MULTIPLE BENIGN NEVI: ICD-10-CM

## 2021-05-25 DIAGNOSIS — L81.4 SOLAR LENTIGINOSIS: ICD-10-CM

## 2021-05-25 PROCEDURE — 11102 TANGNTL BX SKIN SINGLE LES: CPT | Performed by: DERMATOLOGY

## 2021-05-25 PROCEDURE — 99203 OFFICE O/P NEW LOW 30 MIN: CPT | Performed by: DERMATOLOGY

## 2021-05-25 NOTE — PROGRESS NOTES
[address] [facility], [address]       [facility]  1985    28 y.o. female     Date of Visit: 2021    Chief Complaint:   Chief Complaint   Patient presents with    Skin Lesion     spots, tbse        I was asked to see this patient by Dr. Hickman ref. provider found. History of Present Illness:  Krista Rg is a 28 y.o. female who presents with the chief complaint of establish care and for the followin. Total-body skin exam for spots. Many year history of multiple nevi on the head/neck, trunk and extremities, all present for many years. Denies new moles. Denies moles changing in size, shape, color. None associated w/ pain, bleeding, pruritus. 2.  Patient complains of a small bump to her nasal bridge present for for 1 year, unsure if it is changed in size. Denies any changes in color or shape. 3.Progressive freckling and lentigines located to sun exposed areas on head/neck, torso, extremities over several years,Denies changes in size, shape, or color. Admits to sun exposure in youth without wearing sunscreen, hats, or protective clothing. Attempts to wear sunscreen when outdoors for long periods of time. Review of Systems:  Constitutional: Reports general sense of well-being   Skin: No new or changing moles, no tendency to develop thick scars  Heme: No abnormal bruising or bleeding. Past Medical History, Family History, Surgical History, Medications and Allergies reviewed. Past Skin Hx:   Patient denies past history of melanoma, NMSC, dysplastic nevi    PFHx: Denies hx of MM or NMSC    Family History   Problem Relation Age of Onset    Cancer Mother         lymphoma    Rectal Cancer Brother     Rectal Cancer Paternal Grandfather      Past Medical History:   Diagnosis Date    Chicken pox      History reviewed. No pertinent surgical history.     No Known Allergies  Outpatient Medications Marked as Taking for the 21 encounter (Office Visit) with Angie Mendez, DO   Medication Sig Dispense Refill    norethindrone-ethinyl estradiol-iron (AUROVELA FE 1.5/30) 1.5-30 MG-MCG tablet Take 1 tablet by mouth daily 3 packet 3       Social History:   Social History     Socioeconomic History    Marital status:      Spouse name: Not on file    Number of children: Not on file    Years of education: Not on file    Highest education level: Not on file   Occupational History    Occupation: works for [address] Use    Smoking status: Never Smoker    Smokeless tobacco: Never Used   Substance and Sexual Activity    Alcohol use: Yes     Comment: socially     Drug use: Never    Sexual activity: Yes     Partners: Male     Birth control/protection: Pill   Other Topics Concern    Not on file   Social History Narrative    Head of construction for [employer]    4 kids     Social Determinants of Health     Financial Resource Strain:     Difficulty of Paying Living Expenses:    Food Insecurity:     Worried About [address] in the Last Year:     [address] in the Last Year:    Transportation Needs:     Lack of Transportation (Medical):      Lack of Transportation (Non-Medical):    Physical Activity:     Days of Exercise per Week:     Minutes of Exercise per Session:    Stress:     Feeling of Stress :    Social Connections:     Frequency of Communication with Friends and Family:     Frequency of Social Gatherings with Friends and Family:     Attends Sabianism Services:     Active Member of Clubs or Organizations:     Attends Club or Organization Meetings:     Marital Status:    Intimate Partner Violence:     Fear of Current or Ex-Partner:     Emotionally Abused:     Physically Abused:     Sexually Abused:        Physical Examination     The following were examined and determined to be normal: Psych/Neuro, Scalp/hair, Conjunctivae/eyelids, Gums/teeth/lips, Neck, Breast/axilla/chest, Abdomen, Back, RUE, LUE, RLE, LLE and Nails/digits. buttocks. Areas covered by underwear garment(s) not examined. The following were examined and determined to be abnormal: Head/face. Price phototype: 2    -Constitutional: Well appearing, no acute distress  -Neurological: Alert and oriented X 3  -Mood and Affect: Pleasant  Total body skin exam performed, areas examined listed above:   1. Nasal bridge- 0.2cm well circumscribed skin colored to hypopigmented uniform papule      2. Scattered on the head,neck, trunk including right lateral chest lying underneath bra band and extremities are multiple well-defined round and oval symmetric smoothly-bordered uniformly brown macules and papules; no bleeding nevi  3. few discrete 2-4 mm round uniformly brown macules scattered to face, neck, and sun exposed areas on torso and extremities    Assessment and Plan     1. Neoplasm of uncertain behavior    2. Multiple benign nevi    3. Solar lentiginosis        1. Neoplasm of uncertain behavior  DDx: Compound nevus versus fibrous papule of nose rule out atypia  -Discussed possible diagnosis. Patient agreeable to biopsy. Verbal consent obtained after risks (infection, bleeding, scar, dyspigmentation), benefits and alternatives explained. -Area(s) to be biopsied were marked with a surgical pen. Site(s) were cleansed with alcohol. Local anesthesia achieved with 1% lidocaine with epinephrine/sodium bicarbonate. Shave biopsy performed with a razor blade. Hemostasis was achieved with aluminum chloride. The wound(s) were dressed with petrolatum and covered with a bandage. Specimen(s) sent to pathology. Pt educated re: risk of bleeding, infection, scar, discoloration, and wound care instructions.     2. Multiple benign nevi  Benign acquired melanocytic nevi  -Recommend monthly self skin exams   -Educated regarding the ABCDEs of melanoma detection   -Call for any new/changing moles or concerning lesions  -Reviewed sun protective behavior -- sun avoidance during the peak hours of the day, sun-protective clothing (including hat and sunglasses), sunscreen use (water resistant, broad spectrum, SPF at least 30, need for reapplication every 1.5 to 2 hours), avoidance of tanning beds   -Plan: Observation with annual skin checks (earlier if indicated) performed in office to monitor current nevi and to assess for new lesions. 3. Solar lentiginosis  Solar lentigines  -Edu re: benignity, relationship w/ chronic cumulative sun exposure, darkening w/ unprotected sun exposure  -Reviewed sun protective behavior -- sun avoidance during the peak hours of the day, sun-protective clothing (including hat and sunglasses), sunscreen use (water resistant, broad spectrum, SPF at least 30, need for reapplication every 2 to 3 hours), avoidance of tanning beds   Observation, pt to call if changes in size, shape, color or experiences bleeding/pain/itching        Return to Clinic:  1 year skin exam   Discussed plan with patient and/or primary caretaker. Patient to call clinic with any questions / concerns. Reviewed proper use and side effects of treatment(s) and/or medication(s) with patient and/or primary caretaker. AVS provided or is available on [place] [employer]   Note is transcribed using voice recognition software. Inadvertent computerized transcription errors may be present.

## 2021-05-25 NOTE — PATIENT INSTRUCTIONS
Sun Protection Tips    Apply broad spectrum water resistant sunscreen with an SPF of at least 30 to exposed areas of the skin. Dont forget the ears and lips! Remember to reapply sunscreen about every 2 hours and after swimming or sweating. Wear sun protective clothing. Swim shirts (aka. rash guards) are a great idea and negates the need to reapply sunscreen in those areas. Seek the shade whenever possible especially between the hours of 10am and 4pm when the suns rays are the strongest.     Avoid tanning beds          Wear a wide brim hat while in the sun    Biopsy Wound Care Instructions   Cleanse the wound twice a day with mild soapy water.  Gently dry the area.  Apply Vaseline/Aquaphor to the wound using a cotton tipped applicator or Qtip.  Cover with a clean bandage.  Repeat this process until the biopsy site is healed. You will know when it's healed when it's pink and shiny.  You may shower and bathe as usual.      If bleeding should occur, apply firm pressure to bleeding area for 15 minutes and repeat if needed. If bleeding continues after 30 minutes, please call office and ask to speak to Imani.        ** Biopsy results generally take around 7-10  business days to come back. A member of Dr. Lupe Wu staff will call you when the results are have been reviewed and a personalized treatment plan has been established. If you don't hear from our staff after the 10 business days, please call our office for your results. Thanks for your visit! Feel free to call/[employer] message  Dr. Angel Cat assistant, Imani if you have questions, concerns or to schedule your cosmetic procedures.

## 2021-09-02 LAB
CHOLESTEROL, TOTAL: 168 MG/DL (ref 0–199)
GLUCOSE BLD-MCNC: 92 MG/DL (ref 70–99)
HDLC SERPL-MCNC: 49 MG/DL (ref 40–60)
LDL CHOLESTEROL CALCULATED: 81 MG/DL
TRIGL SERPL-MCNC: 191 MG/DL (ref 0–150)

## 2021-09-07 ENCOUNTER — TELEPHONE (OUTPATIENT)
Dept: INTERNAL MEDICINE CLINIC | Age: 36
End: 2021-09-07

## 2021-09-07 NOTE — TELEPHONE ENCOUNTER
----- Message from Elaine Langston sent at 9/7/2021 10:21 AM EDT -----  Subject: Appointment Request    Reason for Call: Routine Physical Exam    QUESTIONS  Type of Appointment? Established Patient  Reason for appointment request? Available appointments did not meet   patient need  Additional Information for Provider? Patient was looking to have her   physical before 10/5 or after 10/5. Patient have to cancel her appt on   10/5 due to a meeting  ---------------------------------------------------------------------------  --------------  [id number]  What is the best way for the office to contact you? OK to leave message on   voicemail  Preferred Call Back Phone Number? [phone]  ---------------------------------------------------------------------------  --------------  SCRIPT ANSWERS  Relationship to Patient? Self  Have your symptoms changed? No  (If the patient has Medicare as their primary insurance coverage ask this   question) Are you requesting a Medicare Annual Wellness Visit? No  (Is the patient requesting a pap smear with their physical exam?)? No  (Is the patient requesting their annual physical and does not need PAP or   AWV per above?)? Yes   Have you been diagnosed with, awaiting test results for, or told that you   are suspected of having COVID-19 (Coronavirus)? (If patient has tested   negative or was tested as a requirement for work, school, or travel and   not based on symptoms, answer no)? No  Do you currently have flu-like symptoms including fever or chills, cough,   shortness of breath, difficulty breathing, or new loss of taste or smell? No  Have you had close contact with someone with COVID-19 in the last 14 days? No  (Service Expert  click yes below to proceed with [facility] As Usual   Scheduling)?  Yes

## 2021-09-07 NOTE — TELEPHONE ENCOUNTER
LM for patient asking her to call office and re-schedule her 10/5 Physical.  First available looks like 11/4 or after. Please schedule when she calls back. Thanks.

## 2021-10-08 ENCOUNTER — TELEPHONE (OUTPATIENT)
Dept: GYNECOLOGY | Age: 36
End: 2021-10-08

## 2021-10-08 NOTE — TELEPHONE ENCOUNTER
Called Colette at [phone] spoke to representative and she informed me that patients insurance was terminated on 12/31/2020 with [facility]. Called patient at [phone] left a message for a return call.  We need patients New insurance info in order to have this completed

## 2021-10-29 NOTE — TELEPHONE ENCOUNTER
Patient is aware of this communication. She sent a [employer]. We show no copy of her medical mutual insurance card in system to get her IUD going.

## 2021-11-04 ENCOUNTER — OFFICE VISIT (OUTPATIENT)
Dept: INTERNAL MEDICINE CLINIC | Age: 36
End: 2021-11-04
Payer: COMMERCIAL

## 2021-11-04 VITALS
RESPIRATION RATE: 12 BRPM | HEART RATE: 76 BPM | WEIGHT: 125.4 LBS | BODY MASS INDEX: 24.62 KG/M2 | HEIGHT: 60 IN | DIASTOLIC BLOOD PRESSURE: 100 MMHG | OXYGEN SATURATION: 100 % | SYSTOLIC BLOOD PRESSURE: 142 MMHG

## 2021-11-04 DIAGNOSIS — R94.31 ABNORMAL EKG: ICD-10-CM

## 2021-11-04 DIAGNOSIS — Z00.00 ANNUAL PHYSICAL EXAM: Primary | ICD-10-CM

## 2021-11-04 DIAGNOSIS — R03.0 ELEVATED BP WITHOUT DIAGNOSIS OF HYPERTENSION: ICD-10-CM

## 2021-11-04 DIAGNOSIS — Z11.59 ENCOUNTER FOR HEPATITIS C SCREENING TEST FOR LOW RISK PATIENT: ICD-10-CM

## 2021-11-04 DIAGNOSIS — Z13.220 ENCOUNTER FOR LIPID SCREENING FOR CARDIOVASCULAR DISEASE: ICD-10-CM

## 2021-11-04 DIAGNOSIS — Z13.6 ENCOUNTER FOR LIPID SCREENING FOR CARDIOVASCULAR DISEASE: ICD-10-CM

## 2021-11-04 LAB
BILIRUBIN URINE: NEGATIVE
BLOOD, URINE: NEGATIVE
CLARITY: CLEAR
COLOR: YELLOW
CREATININE URINE: 20 MG/DL (ref 28–259)
EPITHELIAL CELLS, UA: 1 /HPF (ref 0–5)
GLUCOSE URINE: NEGATIVE MG/DL
HYALINE CASTS: 0 /LPF (ref 0–8)
KETONES, URINE: NEGATIVE MG/DL
LEUKOCYTE ESTERASE, URINE: NEGATIVE
MICROSCOPIC EXAMINATION: NORMAL
NITRITE, URINE: NEGATIVE
PH UA: 7 (ref 5–8)
PROTEIN PROTEIN: <4 MG/DL
PROTEIN UA: NEGATIVE MG/DL
PROTEIN/CREAT RATIO: ABNORMAL MG/DL
RBC UA: 0 /HPF (ref 0–4)
SPECIFIC GRAVITY UA: 1.01 (ref 1–1.03)
URINE TYPE: NORMAL
UROBILINOGEN, URINE: 0.2 E.U./DL
WBC UA: 0 /HPF (ref 0–5)

## 2021-11-04 PROCEDURE — 93000 ELECTROCARDIOGRAM COMPLETE: CPT | Performed by: INTERNAL MEDICINE

## 2021-11-04 PROCEDURE — 99395 PREV VISIT EST AGE 18-39: CPT | Performed by: INTERNAL MEDICINE

## 2021-11-04 ASSESSMENT — ENCOUNTER SYMPTOMS
EYE PAIN: 0
TROUBLE SWALLOWING: 0
DIARRHEA: 0
CONSTIPATION: 0
COUGH: 0
ABDOMINAL PAIN: 0
NAUSEA: 0
SINUS PAIN: 0
COLOR CHANGE: 0
SINUS PRESSURE: 0
WHEEZING: 0
SHORTNESS OF BREATH: 0
BACK PAIN: 0
VOMITING: 0
PHOTOPHOBIA: 0

## 2021-11-04 ASSESSMENT — PATIENT HEALTH QUESTIONNAIRE - PHQ9
SUM OF ALL RESPONSES TO PHQ QUESTIONS 1-9: 1
SUM OF ALL RESPONSES TO PHQ QUESTIONS 1-9: 1
2. FEELING DOWN, DEPRESSED OR HOPELESS: 1
SUM OF ALL RESPONSES TO PHQ QUESTIONS 1-9: 1
1. LITTLE INTEREST OR PLEASURE IN DOING THINGS: 0
SUM OF ALL RESPONSES TO PHQ9 QUESTIONS 1 & 2: 1

## 2021-11-04 NOTE — PROGRESS NOTES
History and Physical      Edel Mccallum  YOB: 1985    Date of Service:  11/4/2021    Vitals:    11/04/21 1402   BP: (!) 142/100   Pulse: 76   Resp: 12   SpO2: 100%   Weight: 125 lb 6.4 oz (56.9 kg)   Height: 5' (1.524 m)     Body mass index is 24.49 kg/m². Wt Readings from Last 3 Encounters:   11/04/21 125 lb 6.4 oz (56.9 kg)   04/05/21 121 lb 9.6 oz (55.2 kg)   09/30/20 122 lb 3.2 oz (55.4 kg)     BP Readings from Last 3 Encounters:   11/04/21 (!) 142/100   04/05/21 98/66   09/30/20 (!) 120/91       Chief Complaint:Edel Mccallum is a 39 y.o. female who presents for complete physical examination. HPI:     Blood pressure was high when she went to give blood in July. BP was high at her Be Well Screening. Got BP monitor at home - has been high at home as well. Has been decreasing caffeine intake. Not a high sodium diet. Has been off of OCPs for 3 weeks    Maternal and paternal grandparents had HTN. There is no problem list on file for this patient. No Known Allergies  No outpatient medications have been marked as taking for the 11/4/21 encounter (Office Visit) with Deysi Marsh MD.       Past Medical History:   Diagnosis Date    Chicken pox      No past surgical history on file. Family History   Problem Relation Age of Onset    Cancer Mother         lymphoma    Rectal Cancer Brother     Rectal Cancer Paternal Grandfather        Review of Systems:  Review of Systems   Constitutional: Negative for appetite change, fatigue, fever and unexpected weight change. No night sweats   HENT: Negative for congestion, ear pain, hearing loss, nosebleeds, sinus pressure, sinus pain, sneezing, tinnitus and trouble swallowing. Eyes: Negative for photophobia, pain and visual disturbance. Respiratory: Negative for cough, shortness of breath and wheezing. Cardiovascular: Negative for chest pain, palpitations and leg swelling.    Gastrointestinal: Negative for abdominal pain, constipation, diarrhea, nausea and vomiting. Endocrine: Negative for cold intolerance, heat intolerance, polydipsia, polyphagia and polyuria. Genitourinary: Negative for difficulty urinating, dysuria, frequency, hematuria and urgency. Musculoskeletal: Negative for arthralgias, back pain, joint swelling, myalgias and neck pain. Skin: Negative for color change and rash. Allergic/Immunologic: Negative for environmental allergies, food allergies and immunocompromised state. Neurological: Negative for dizziness, syncope, speech difficulty, weakness, light-headedness, numbness and headaches. Hematological: Negative for adenopathy. Does not bruise/bleed easily. Psychiatric/Behavioral: Negative for dysphoric mood and sleep disturbance. The patient is not nervous/anxious. Physical Exam  Constitutional:       General: She is not in acute distress. Appearance: Normal appearance. She is not diaphoretic. HENT:      Head: Normocephalic and atraumatic. Right Ear: External ear normal.      Left Ear: External ear normal.      Nose: Nose normal.      Mouth/Throat:      Mouth: Mucous membranes are moist.      Pharynx: Oropharynx is clear. Eyes:      General: No scleral icterus. Conjunctiva/sclera: Conjunctivae normal.   Cardiovascular:      Rate and Rhythm: Normal rate and regular rhythm. Pulses: Normal pulses. Heart sounds: Normal heart sounds. No murmur heard. No friction rub. No gallop. Pulmonary:      Effort: Pulmonary effort is normal.      Breath sounds: Normal breath sounds. No wheezing, rhonchi or rales. Abdominal:      General: Abdomen is flat. Bowel sounds are normal.   Musculoskeletal:         General: No deformity. Cervical back: Normal range of motion. Right lower leg: No edema. Left lower leg: No edema. Skin:     General: Skin is warm and dry. Findings: No rash. Neurological:      General: No focal deficit present.  Attends Anabaptism Services:     Active Member of Clubs or Organizations:     Attends Club or Organization Meetings:     Marital Status:    Intimate Partner Violence:     Fear of Current or Ex-Partner:     Emotionally Abused:     Physically Abused:     Sexually Abused:      Social History     Substance and Sexual Activity   Sexual Activity Yes    Partners: Male    Birth control/protection: Pill       Advance Directive: N, <no information>    Immunization History   Administered Date(s) Administered    COVID-19, Marti Peter, PF, 30mcg/0.3mL 04/07/2021, 04/28/2021    Influenza Virus Vaccine 11/01/2019, 10/08/2020    Influenza, Quadv, IM, (6 mo and older Fluzone, Flulaval, Fluarix and 3 yrs and older Afluria) 10/06/2021    Tdap (Boostrix, Adacel) 08/15/2016, 04/12/2018       Health Maintenance   Topic Date Due    Hepatitis C screen  Never done    Varicella vaccine (1 of 2 - 2-dose childhood series) Never done    Cervical cancer screen  04/05/2024    DTaP/Tdap/Td vaccine (3 - Td or Tdap) 04/12/2028    Flu vaccine  Completed    COVID-19 Vaccine  Completed    HIV screen  Completed    Hepatitis A vaccine  Aged Out    Hepatitis B vaccine  Aged Out    Hib vaccine  Aged Out    Meningococcal (ACWY) vaccine  Aged Out    Pneumococcal 0-64 years Vaccine  Aged Out        Assessment/Plan:  1. Annual physical exam  Care gaps identified and addressed  Screening positive for HTN  UTD on cancer screenings  UTD on IMZ  Discussed family planning - in process of getting IUD. Continue healthy diet and exercise  Mental health and stress reduction techniques discussed  2. Elevated BP without diagnosis of hypertension  BP elevated here today. No real risk factors for development of HTN. EKG with some nonspecific abnormalities. Will get TTE. Will also get lab and urine work up. Will let estrogen wash out of system for longer. Home BP monitoring and follow up in this office in 6 weeks.  If still elevated will start medication.   -     EKG 12 Lead  -     CBC Auto Differential; Future  -     COMPREHENSIVE METABOLIC PANEL; Future  -     TSH with Reflex; Future  -     METANEPHRINES PLASMA FREE; Future  -     URINALYSIS WITH MICROSCOPIC  -     Protein / Creatinine Ratio, Urine  -     Renin Activity and Aldosterone; Future  -     ECHO Complete 2D W Doppler W Color; Future  3. Encounter for lipid screening for cardiovascular disease  -     Lipid Panel; Future  4. Encounter for hepatitis C screening test for low risk patient  -     HEPATITIS C ANTIBODY; Future  5. Abnormal EKG  -     ECHO Complete 2D W Doppler W Color; Future         Return in about 6 weeks (around 12/16/2021) for HTN.

## 2021-11-05 DIAGNOSIS — Z11.59 ENCOUNTER FOR HEPATITIS C SCREENING TEST FOR LOW RISK PATIENT: ICD-10-CM

## 2021-11-05 DIAGNOSIS — R03.0 ELEVATED BP WITHOUT DIAGNOSIS OF HYPERTENSION: ICD-10-CM

## 2021-11-05 DIAGNOSIS — Z13.6 ENCOUNTER FOR LIPID SCREENING FOR CARDIOVASCULAR DISEASE: ICD-10-CM

## 2021-11-05 DIAGNOSIS — Z13.220 ENCOUNTER FOR LIPID SCREENING FOR CARDIOVASCULAR DISEASE: ICD-10-CM

## 2021-11-05 LAB
A/G RATIO: 2 (ref 1.1–2.2)
ALBUMIN SERPL-MCNC: 4.7 G/DL (ref 3.4–5)
ALP BLD-CCNC: 44 U/L (ref 40–129)
ALT SERPL-CCNC: 12 U/L (ref 10–40)
ANION GAP SERPL CALCULATED.3IONS-SCNC: 14 MMOL/L (ref 3–16)
AST SERPL-CCNC: 19 U/L (ref 15–37)
BASOPHILS ABSOLUTE: 0 K/UL (ref 0–0.2)
BASOPHILS RELATIVE PERCENT: 0.8 %
BILIRUB SERPL-MCNC: 0.6 MG/DL (ref 0–1)
BUN BLDV-MCNC: 12 MG/DL (ref 7–20)
CALCIUM SERPL-MCNC: 9.7 MG/DL (ref 8.3–10.6)
CHLORIDE BLD-SCNC: 102 MMOL/L (ref 99–110)
CHOLESTEROL, TOTAL: 223 MG/DL (ref 0–199)
CO2: 24 MMOL/L (ref 21–32)
CREAT SERPL-MCNC: 0.8 MG/DL (ref 0.6–1.1)
EOSINOPHILS ABSOLUTE: 0.1 K/UL (ref 0–0.6)
EOSINOPHILS RELATIVE PERCENT: 2.5 %
GFR AFRICAN AMERICAN: >60
GFR NON-AFRICAN AMERICAN: >60
GLUCOSE BLD-MCNC: 84 MG/DL (ref 70–99)
HCT VFR BLD CALC: 41.6 % (ref 36–48)
HDLC SERPL-MCNC: 64 MG/DL (ref 40–60)
HEMOGLOBIN: 14.3 G/DL (ref 12–16)
HEPATITIS C ANTIBODY INTERPRETATION: NORMAL
LDL CHOLESTEROL CALCULATED: 116 MG/DL
LYMPHOCYTES ABSOLUTE: 1.2 K/UL (ref 1–5.1)
LYMPHOCYTES RELATIVE PERCENT: 26.2 %
MCH RBC QN AUTO: 30 PG (ref 26–34)
MCHC RBC AUTO-ENTMCNC: 34.3 G/DL (ref 31–36)
MCV RBC AUTO: 87.5 FL (ref 80–100)
MONOCYTES ABSOLUTE: 0.3 K/UL (ref 0–1.3)
MONOCYTES RELATIVE PERCENT: 7.5 %
NEUTROPHILS ABSOLUTE: 2.8 K/UL (ref 1.7–7.7)
NEUTROPHILS RELATIVE PERCENT: 63 %
PDW BLD-RTO: 12.4 % (ref 12.4–15.4)
PLATELET # BLD: 129 K/UL (ref 135–450)
PMV BLD AUTO: 11.2 FL (ref 5–10.5)
POTASSIUM SERPL-SCNC: 3.8 MMOL/L (ref 3.5–5.1)
RBC # BLD: 4.76 M/UL (ref 4–5.2)
SODIUM BLD-SCNC: 140 MMOL/L (ref 136–145)
TOTAL PROTEIN: 7.1 G/DL (ref 6.4–8.2)
TRIGL SERPL-MCNC: 213 MG/DL (ref 0–150)
TSH REFLEX: 2.87 UIU/ML (ref 0.27–4.2)
VLDLC SERPL CALC-MCNC: 43 MG/DL
WBC # BLD: 4.4 K/UL (ref 4–11)

## 2021-11-10 LAB
METANEPH/PLASMA INTERP: NORMAL
METANEPHRINE FREE PLASMA: <0.1 NMOL/L (ref 0–0.49)
NORMETANEPHRINE FREE PLASMA: 0.51 NMOL/L (ref 0–0.89)

## 2021-11-11 LAB
ALDOSTERONE/RENIN ACTIVITY CALCULATION: 3 RATIO
ALDOSTERONE: 17.8 NG/DL
RENIN ACTIVITY: 5.9 NG/ML/HR

## 2021-11-13 ENCOUNTER — HOSPITAL ENCOUNTER (OUTPATIENT)
Dept: NON INVASIVE DIAGNOSTICS | Age: 36
Discharge: HOME | End: 2021-11-13
Payer: COMMERCIAL

## 2021-11-13 DIAGNOSIS — R03.0 ELEVATED BP WITHOUT DIAGNOSIS OF HYPERTENSION: ICD-10-CM

## 2021-11-13 DIAGNOSIS — R94.31 ABNORMAL EKG: ICD-10-CM

## 2021-11-13 LAB
LV EF: 58 %
LVEF MODALITY: NORMAL

## 2021-11-13 PROCEDURE — 93306 TTE W/DOPPLER COMPLETE: CPT

## 2022-01-25 ENCOUNTER — OFFICE VISIT (OUTPATIENT)
Dept: INTERNAL MEDICINE CLINIC | Age: 37
End: 2022-01-25
Payer: COMMERCIAL

## 2022-01-25 VITALS
DIASTOLIC BLOOD PRESSURE: 88 MMHG | HEART RATE: 83 BPM | RESPIRATION RATE: 12 BRPM | OXYGEN SATURATION: 100 % | SYSTOLIC BLOOD PRESSURE: 122 MMHG

## 2022-01-25 DIAGNOSIS — R91.8 PULMONARY NODULES: ICD-10-CM

## 2022-01-25 DIAGNOSIS — R03.0 ELEVATED BP WITHOUT DIAGNOSIS OF HYPERTENSION: Primary | ICD-10-CM

## 2022-01-25 PROCEDURE — 99213 OFFICE O/P EST LOW 20 MIN: CPT | Performed by: INTERNAL MEDICINE

## 2022-01-25 ASSESSMENT — ENCOUNTER SYMPTOMS
WHEEZING: 0
NAUSEA: 0
ABDOMINAL PAIN: 0
COUGH: 0
VOMITING: 0
SHORTNESS OF BREATH: 0
DIARRHEA: 0

## 2022-01-25 NOTE — PROGRESS NOTES
Edel Mccallum (:  1985) is a 39 y.o. female,Established patient, here for evaluation of the following chief complaint(s):  Hypertension      ASSESSMENT/PLAN:  1. Elevated BP without diagnosis of hypertension  Comments:  BP improve with borderline diastolic today. Continue to monitor at home. DIscussed lifestyle measures. 2. Pulmonary nodules  Comments:  CT chest to eval pulmonary nodules up to 1cm seen on prior abdominal CT. Orders:  -     CT CHEST WO CONTRAST; Future      Return in about 11 months (around 2022). SUBJECTIVE/OBJECTIVE:  HPI    Here for 1 month follow up  Headaches have improved  BP at home has come down overall  systolic over 77K usually. Still lots of life stress. Work up including labs (TSH, CBC, CMP, plasma metanephrines, renin/gabi) and echo were normal.    Had CT abdomen in 2020 which incidentally found some pulmonary nodules which have not been reimaged. Review of Systems   Constitutional: Negative for chills, fatigue and fever. Respiratory: Negative for cough, shortness of breath and wheezing. Cardiovascular: Negative for chest pain, palpitations and leg swelling. Gastrointestinal: Negative for abdominal pain, diarrhea, nausea and vomiting. Neurological: Negative for headaches. No current outpatient medications on file prior to visit. No current facility-administered medications on file prior to visit. Vitals:    22 1308   BP: 122/88   Pulse: 83   Resp: 12   SpO2: 100%     Physical Exam  Constitutional:       General: She is not in acute distress. Appearance: Normal appearance. She is not diaphoretic. HENT:      Head: Normocephalic and atraumatic. Right Ear: External ear normal.      Left Ear: External ear normal.      Nose: Nose normal.      Mouth/Throat:      Mouth: Mucous membranes are moist.      Pharynx: Oropharynx is clear. Eyes:      General: No scleral icterus.      Conjunctiva/sclera: Conjunctivae normal.   Cardiovascular:      Rate and Rhythm: Normal rate and regular rhythm. Pulses: Normal pulses. Heart sounds: Normal heart sounds. No murmur heard. No friction rub. No gallop. Pulmonary:      Effort: Pulmonary effort is normal.      Breath sounds: Normal breath sounds. No wheezing, rhonchi or rales. Abdominal:      General: Abdomen is flat. Bowel sounds are normal.   Musculoskeletal:         General: No deformity. Cervical back: Normal range of motion. Right lower leg: No edema. Left lower leg: No edema. Skin:     General: Skin is warm and dry. Findings: No rash. Neurological:      General: No focal deficit present. Mental Status: She is alert. Coordination: Coordination normal.      Gait: Gait normal.   Psychiatric:         Mood and Affect: Mood normal.         Behavior: Behavior normal.                 An electronic signature was used to authenticate this note.     --Allyn Redd MD

## 2022-05-23 ENCOUNTER — OFFICE VISIT (OUTPATIENT)
Dept: GYNECOLOGY | Age: 37
End: 2022-05-23
Payer: COMMERCIAL

## 2022-05-23 VITALS
WEIGHT: 122.8 LBS | DIASTOLIC BLOOD PRESSURE: 78 MMHG | HEIGHT: 60 IN | HEART RATE: 98 BPM | RESPIRATION RATE: 17 BRPM | OXYGEN SATURATION: 99 % | BODY MASS INDEX: 24.11 KG/M2 | SYSTOLIC BLOOD PRESSURE: 122 MMHG

## 2022-05-23 DIAGNOSIS — Z01.419 WELL WOMAN EXAM WITH ROUTINE GYNECOLOGICAL EXAM: Primary | ICD-10-CM

## 2022-05-23 PROCEDURE — 99395 PREV VISIT EST AGE 18-39: CPT | Performed by: OBSTETRICS & GYNECOLOGY

## 2022-05-25 ASSESSMENT — ENCOUNTER SYMPTOMS
GASTROINTESTINAL NEGATIVE: 1
EYES NEGATIVE: 1
RESPIRATORY NEGATIVE: 1

## 2022-05-26 NOTE — PROGRESS NOTES
Subjective:      Patient ID: Hima Robison is a 39 y.o. female. Patient is here for annual.     Gynecologic Exam        Review of Systems   Constitutional: Negative. HENT: Negative. Eyes: Negative. Respiratory: Negative. Cardiovascular: Negative. Gastrointestinal: Negative. Genitourinary: Negative. Musculoskeletal: Negative. Skin: Negative. Neurological: Negative. Psychiatric/Behavioral: Negative. Date of Birth 1985  Past Medical History:   Diagnosis Date    Chicken pox      No past surgical history on file.   OB History    Para Term  AB Living   5 4 4   1 4   SAB IAB Ectopic Molar Multiple Live Births   1                # Outcome Date GA Lbr Patricio/2nd Weight Sex Delivery Anes PTL Lv   5 SAB            4 Term     M Vag-Spont      3 Term     F Vag-Spont      2 Term     F Vag-Spont         Complications: Group B streptococcal infection   1 Term     F Vag-Spont         Obstetric Comments   Patient states she had a miscarriage between child 2 and 3 at 17 weeks      Social History     Socioeconomic History    Marital status:      Spouse name: Not on file    Number of children: Not on file    Years of education: Not on file    Highest education level: Not on file   Occupational History    Occupation: works for [employer]   Tobacco Use    Smoking status: Never Smoker    Smokeless tobacco: Never Used   Substance and Sexual Activity    Alcohol use: Yes     Comment: socially     Drug use: Never    Sexual activity: Yes     Partners: Male     Birth control/protection: Pill   Other Topics Concern    Not on file   Social History Narrative    Head of construction for [employer]    4 kids     Social Determinants of Health     Financial Resource Strain:     Difficulty of Paying Living Expenses: Not on file   Food Insecurity:     Worried About Running Out of Food in the Last Year: Not on file    Rakel of Food in the Last Year: Not on file   Transportation Needs:  Lack of Transportation (Medical): Not on file    Lack of Transportation (Non-Medical): Not on file   Physical Activity:     Days of Exercise per Week: Not on file    Minutes of Exercise per Session: Not on file   Stress:     Feeling of Stress : Not on file   Social Connections:     Frequency of Communication with Friends and Family: Not on file    Frequency of Social Gatherings with Friends and Family: Not on file    Attends Baptism Services: Not on file    Active Member of [address] [place] [address] [employer] or Organizations: Not on file    Attends Club or Organization Meetings: Not on file    Marital Status: Not on file   Intimate Partner Violence:     Fear of Current or Ex-Partner: Not on file    Emotionally Abused: Not on file    Physically Abused: Not on file    Sexually Abused: Not on file   Housing Stability:     Unable to Pay for Housing in the Last Year: Not on file    Number of [place] in the Last Year: Not on file    Unstable Housing in the Last Year: Not on file     No Known Allergies  No outpatient medications have been marked as taking for the 5/23/22 encounter (Office Visit) with Asher Gomes MD.     Family History   Problem Relation Age of Onset    Cancer Mother         lymphoma    Rectal Cancer Brother     Hypertension Maternal Grandmother     Diabetes Maternal Grandmother     Hypertension Paternal Grandmother     Diabetes Paternal Grandmother     Rectal Cancer Paternal Grandfather      /78 (Site: Right Upper Arm, Position: Sitting, Cuff Size: Medium Adult)   Pulse 98   Resp 17   Ht 5' (1.524 m)   Wt 122 lb 12.8 oz (55.7 kg)   SpO2 99%   BMI 23.98 kg/m²       Objective:   Physical Exam  Constitutional:       Appearance: Normal appearance. She is well-developed and normal weight. HENT:      Head: Normocephalic. Nose: Nose normal.      Mouth/Throat:      Mouth: Mucous membranes are moist.      Pharynx: Oropharynx is clear.    Eyes:      Pupils: Pupils are equal, round, and reactive to light. Neck:      Thyroid: No thyromegaly. Cardiovascular:      Rate and Rhythm: Normal rate and regular rhythm. Pulses: Normal pulses. Heart sounds: Normal heart sounds. No murmur heard. No friction rub. No gallop. Pulmonary:      Effort: Pulmonary effort is normal. No respiratory distress. Breath sounds: Normal breath sounds. No stridor. No wheezing, rhonchi or rales. Chest:      Chest wall: No tenderness. Breasts:      Right: Normal. No swelling, bleeding, inverted nipple, mass, nipple discharge, skin change or tenderness. Left: Normal. No swelling, bleeding, inverted nipple, mass, nipple discharge, skin change or tenderness. Abdominal:      General: Bowel sounds are normal. There is no distension. Palpations: Abdomen is soft. There is no mass. Tenderness: There is no abdominal tenderness. There is no guarding or rebound. Hernia: No hernia is present. There is no hernia in the left inguinal area. Genitourinary:     General: Normal vulva. Exam position: Lithotomy position. Pubic Area: No rash. Labia:         Right: No rash, tenderness, lesion or injury. Left: No rash, tenderness, lesion or injury. Urethra: No prolapse, urethral pain, urethral swelling or urethral lesion. Vagina: No signs of injury and foreign body. No vaginal discharge, erythema, tenderness, bleeding, lesions or prolapsed vaginal walls. Cervix: No cervical motion tenderness, discharge, friability, lesion, erythema, cervical bleeding or eversion. Uterus: Not deviated, not enlarged, not fixed and not tender. Adnexa:         Right: No mass, tenderness or fullness. Left: No mass, tenderness or fullness. Rectum: No anal fissure or external hemorrhoid. Comments: Normal urethral meatus, normal urethra, nl bladder  Musculoskeletal:         General: No tenderness. Normal range of motion.       Cervical back: Normal range

## 2022-12-14 ENCOUNTER — E-VISIT (OUTPATIENT)
Dept: PRIMARY CARE CLINIC | Age: 37
End: 2022-12-14
Payer: COMMERCIAL

## 2022-12-14 DIAGNOSIS — J06.9 UPPER RESPIRATORY TRACT INFECTION, UNSPECIFIED TYPE: Primary | ICD-10-CM

## 2022-12-14 PROCEDURE — 99422 OL DIG E/M SVC 11-20 MIN: CPT | Performed by: NURSE PRACTITIONER

## 2022-12-14 RX ORDER — AMOXICILLIN AND CLAVULANATE POTASSIUM 875; 125 MG/1; MG/1
1 TABLET, FILM COATED ORAL 2 TIMES DAILY
Qty: 20 TABLET | Refills: 0 | Status: SHIPPED | OUTPATIENT
Start: 2022-12-14 | End: 2022-12-24

## 2022-12-14 ASSESSMENT — LIFESTYLE VARIABLES: SMOKING_STATUS: NO, I'VE NEVER SMOKED

## 2022-12-14 NOTE — PROGRESS NOTES
Edel Mccallum (1985) initiated an asynchronous digital communication through [address] [place] [address]. HPI: per patient questionnaire     Exam: not applicable    Diagnoses and all orders for this visit:  Diagnoses and all orders for this visit:    Upper respiratory tract infection, unspecified type    Other orders  -     amoxicillin-clavulanate (AUGMENTIN) 875-125 MG per tablet; Take 1 tablet by mouth 2 times daily for 10 days  Supportive care measures provided  Recommend follow-up with PCP if symptoms do not improve  Time: EV2 - 11-20 minutes were spent on the digital evaluation and management of this patient.  13 min     Sunil Hill, APRN - CNP

## 2023-05-15 ENCOUNTER — OFFICE VISIT (OUTPATIENT)
Dept: INTERNAL MEDICINE CLINIC | Age: 38
End: 2023-05-15
Payer: COMMERCIAL

## 2023-05-15 VITALS
SYSTOLIC BLOOD PRESSURE: 138 MMHG | DIASTOLIC BLOOD PRESSURE: 92 MMHG | OXYGEN SATURATION: 99 % | HEART RATE: 97 BPM | WEIGHT: 125 LBS | BODY MASS INDEX: 24.54 KG/M2 | HEIGHT: 60 IN

## 2023-05-15 DIAGNOSIS — R03.0 ELEVATED BP WITHOUT DIAGNOSIS OF HYPERTENSION: Primary | ICD-10-CM

## 2023-05-15 DIAGNOSIS — R91.8 PULMONARY NODULES: ICD-10-CM

## 2023-05-15 DIAGNOSIS — Z13.6 ENCOUNTER FOR LIPID SCREENING FOR CARDIOVASCULAR DISEASE: ICD-10-CM

## 2023-05-15 DIAGNOSIS — Z13.220 ENCOUNTER FOR LIPID SCREENING FOR CARDIOVASCULAR DISEASE: ICD-10-CM

## 2023-05-15 PROCEDURE — 99214 OFFICE O/P EST MOD 30 MIN: CPT | Performed by: INTERNAL MEDICINE

## 2023-05-15 SDOH — ECONOMIC STABILITY: HOUSING INSECURITY
IN THE LAST 12 MONTHS, WAS THERE A TIME WHEN YOU DID NOT HAVE A STEADY PLACE TO SLEEP OR SLEPT IN A SHELTER (INCLUDING NOW)?: NO

## 2023-05-15 SDOH — ECONOMIC STABILITY: INCOME INSECURITY: HOW HARD IS IT FOR YOU TO PAY FOR THE VERY BASICS LIKE FOOD, HOUSING, MEDICAL CARE, AND HEATING?: NOT HARD AT ALL

## 2023-05-15 SDOH — ECONOMIC STABILITY: FOOD INSECURITY: WITHIN THE PAST 12 MONTHS, THE FOOD YOU BOUGHT JUST DIDN'T LAST AND YOU DIDN'T HAVE MONEY TO GET MORE.: NEVER TRUE

## 2023-05-15 SDOH — ECONOMIC STABILITY: FOOD INSECURITY: WITHIN THE PAST 12 MONTHS, YOU WORRIED THAT YOUR FOOD WOULD RUN OUT BEFORE YOU GOT MONEY TO BUY MORE.: NEVER TRUE

## 2023-05-15 ASSESSMENT — PATIENT HEALTH QUESTIONNAIRE - PHQ9
SUM OF ALL RESPONSES TO PHQ QUESTIONS 1-9: 0
SUM OF ALL RESPONSES TO PHQ9 QUESTIONS 1 & 2: 0
1. LITTLE INTEREST OR PLEASURE IN DOING THINGS: 0
2. FEELING DOWN, DEPRESSED OR HOPELESS: 0
SUM OF ALL RESPONSES TO PHQ QUESTIONS 1-9: 0

## 2023-05-15 NOTE — PROGRESS NOTES
Edel Mccallum (:  1985) is a 40 y.o. female,Established patient, here for evaluation of the following chief complaint(s):  No chief complaint on file. ASSESSMENT/PLAN:  1. Elevated BP without diagnosis of hypertension  Comments:  BP just above goal Has been 130s/80s at home. Continue to monitor at home. Check labs including TSH. Prior work up negative. Behavioral mods discussed. Orders:  -     CBC with Auto Differential; Future  -     Comprehensive Metabolic Panel; Future  -     TSH with Reflex; Future  2. Pulmonary nodules  Assessment & Plan:  CT 9/3/2020 incidentally found multiple pulmonary nodules - 3 closely associated noncalcified subpleural nodules are located in the right middle lobe anteriorly. Largest of these measures 1 cm series 3 image 10 and the other 2 measure 4 mm each, images 3 and 5. They have not been followed up on. Recommend she repeat CT scan to assess for growth. Orders:  -     CT CHEST WO CONTRAST; Future  3. Encounter for lipid screening for cardiovascular disease  -     Lipid Panel; Future      Return in about 1 year (around 5/15/2024) for Physical.    SUBJECTIVE/OBJECTIVE:  HPI    Overall doing well  Checking BP once a week or so. BP 130s/80s usually. Occasionally 140s/90s. Review of Systems    No current outpatient medications on file prior to visit. No current facility-administered medications on file prior to visit. Vitals:    05/15/23 1330   BP: (!) 138/92   Pulse: 97   SpO2: 99%     Physical Exam  Constitutional:       General: She is not in acute distress. Appearance: Normal appearance. She is not diaphoretic. HENT:      Head: Normocephalic and atraumatic. Right Ear: External ear normal.      Left Ear: External ear normal.      Nose: Nose normal.      Mouth/Throat:      Mouth: Mucous membranes are moist.      Pharynx: Oropharynx is clear. Eyes:      General: No scleral icterus.      Conjunctiva/sclera: Conjunctivae normal.

## 2023-05-15 NOTE — ASSESSMENT & PLAN NOTE
CT 9/3/2020 incidentally found multiple pulmonary nodules - 3 closely associated noncalcified subpleural nodules are located in the right middle lobe anteriorly. Largest of these measures 1 cm series 3 image 10 and the other 2 measure 4 mm each, images 3 and 5. They have not been followed up on. Recommend she repeat CT scan to assess for growth.

## 2023-08-20 ENCOUNTER — PATIENT MESSAGE (OUTPATIENT)
Dept: INTERNAL MEDICINE CLINIC | Age: 38
End: 2023-08-20

## 2023-08-20 DIAGNOSIS — R03.0 ELEVATED BP WITHOUT DIAGNOSIS OF HYPERTENSION: Primary | ICD-10-CM

## 2023-08-22 RX ORDER — AMLODIPINE BESYLATE 2.5 MG/1
2.5 TABLET ORAL DAILY
Qty: 90 TABLET | Refills: 1 | Status: SHIPPED | OUTPATIENT
Start: 2023-08-22

## 2023-08-22 NOTE — TELEPHONE ENCOUNTER
From: Cheyanne Mccallum  To: Dr. Trip Melendez: 8/20/2023 12:01 PM EDT  Subject: Blood pressure meds    Heljessica Watt    My blood pressure has remained high, and I think I'm ready to start a blood pressure medication. I want to start with the lowest dose possible to see if I respond.

## 2023-09-01 ENCOUNTER — HOSPITAL ENCOUNTER (OUTPATIENT)
Dept: CT IMAGING | Age: 38
Discharge: HOME | End: 2023-09-01
Payer: COMMERCIAL

## 2023-09-01 DIAGNOSIS — R91.8 PULMONARY NODULES: ICD-10-CM

## 2023-09-01 PROCEDURE — 71250 CT THORAX DX C-: CPT

## 2023-11-09 ENCOUNTER — OFFICE VISIT (OUTPATIENT)
Dept: INTERNAL MEDICINE CLINIC | Age: 38
End: 2023-11-09
Payer: COMMERCIAL

## 2023-11-09 VITALS
SYSTOLIC BLOOD PRESSURE: 124 MMHG | OXYGEN SATURATION: 99 % | DIASTOLIC BLOOD PRESSURE: 90 MMHG | WEIGHT: 127 LBS | HEIGHT: 60 IN | BODY MASS INDEX: 24.94 KG/M2 | HEART RATE: 81 BPM

## 2023-11-09 DIAGNOSIS — N29 NEPHROCALCINOSIS: ICD-10-CM

## 2023-11-09 DIAGNOSIS — I10 PRIMARY HYPERTENSION: Primary | ICD-10-CM

## 2023-11-09 DIAGNOSIS — E83.59 NEPHROCALCINOSIS: ICD-10-CM

## 2023-11-09 DIAGNOSIS — I10 PRIMARY HYPERTENSION: ICD-10-CM

## 2023-11-09 DIAGNOSIS — D69.6 THROMBOCYTOPENIA, UNSPECIFIED (HCC): ICD-10-CM

## 2023-11-09 DIAGNOSIS — R91.8 PULMONARY NODULES: ICD-10-CM

## 2023-11-09 LAB
BASOPHILS # BLD: 0 K/UL (ref 0–0.2)
BASOPHILS NFR BLD: 0.6 %
DEPRECATED RDW RBC AUTO: 12.2 % (ref 12.4–15.4)
EOSINOPHIL # BLD: 0.1 K/UL (ref 0–0.6)
EOSINOPHIL NFR BLD: 2.2 %
HCT VFR BLD AUTO: 40.6 % (ref 36–48)
HGB BLD-MCNC: 14.3 G/DL (ref 12–16)
LYMPHOCYTES # BLD: 1.6 K/UL (ref 1–5.1)
LYMPHOCYTES NFR BLD: 28.3 %
MCH RBC QN AUTO: 30.5 PG (ref 26–34)
MCHC RBC AUTO-ENTMCNC: 35.3 G/DL (ref 31–36)
MCV RBC AUTO: 86.4 FL (ref 80–100)
MONOCYTES # BLD: 0.4 K/UL (ref 0–1.3)
MONOCYTES NFR BLD: 6.8 %
NEUTROPHILS # BLD: 3.5 K/UL (ref 1.7–7.7)
NEUTROPHILS NFR BLD: 62.1 %
PLATELET # BLD AUTO: 155 K/UL (ref 135–450)
PMV BLD AUTO: 11 FL (ref 5–10.5)
RBC # BLD AUTO: 4.69 M/UL (ref 4–5.2)
REASON FOR REJECTION: NORMAL
REJECTED TEST: NORMAL
WBC # BLD AUTO: 5.7 K/UL (ref 4–11)

## 2023-11-09 PROCEDURE — 99214 OFFICE O/P EST MOD 30 MIN: CPT | Performed by: INTERNAL MEDICINE

## 2023-11-09 PROCEDURE — 3074F SYST BP LT 130 MM HG: CPT | Performed by: INTERNAL MEDICINE

## 2023-11-09 PROCEDURE — 3080F DIAST BP >= 90 MM HG: CPT | Performed by: INTERNAL MEDICINE

## 2023-11-09 NOTE — ASSESSMENT & PLAN NOTE
CT 9/3/2020 incidentally found multiple pulmonary nodules - 3 closely associated noncalcified subpleural nodules are located in the right middle lobe anteriorly. Largest of these measures 1 cm series 3 image 10 and the other 2 measure 4 mm each, images 3 and 5. Repeat CT scan in 9/2023 showed stability of nodules over the 3 year period indicating benign process.

## 2023-11-09 NOTE — ASSESSMENT & PLAN NOTE
Seen incidentally on CT of chest. No hx of kidney stones. Prior calcium levels have been normal. Will get labs and stoke risk profile.  May need nephrology referral.

## 2023-11-09 NOTE — ASSESSMENT & PLAN NOTE
Concern for possible secondary HTN. Did have high renin activity (normal renin/gabi ratio) when previously measured but had been on OCPs. Will repeat labs today.  May need nephrology referral.

## 2023-11-09 NOTE — PROGRESS NOTES
Edel Mccallum (:  1985) is a 45 y.o. female,Established patient, here for evaluation of the following chief complaint(s):  Hypertension (Follow up )      ASSESSMENT/PLAN:  1. Primary hypertension  Assessment & Plan:  Concern for possible secondary HTN. Did have high renin activity (normal renin/gabi ratio) when previously measured but had been on OCPs. Will repeat labs today. May need nephrology referral.    Orders:  -     METANEPHRINES PLASMA FREE; Future  -     Renin Activity and Aldosterone; Future  -     CATECHOLAMINES, FRACTIONATED, PLASMA; Future  2. Nephrocalcinosis  Assessment & Plan:  Seen incidentally on CT of chest. No hx of kidney stones. Prior calcium levels have been normal. Will get labs and stoke risk profile. May need nephrology referral.    Orders:  -     [address]; Future  -     Urinalysis with Microscopic  -     METANEPHRINES PLASMA FREE; Future  -     CBC with Auto Differential; Future  -     Comprehensive Metabolic Panel; Future  -     Phosphorus; Future  -     Vitamin D 25 Hydroxy; Future  -     Renin Activity and Aldosterone; Future  -     CATECHOLAMINES, FRACTIONATED, PLASMA; Future  3. Thrombocytopenia, unspecified ([address])  4. Pulmonary nodules  Assessment & Plan:  CT 9/3/2020 incidentally found multiple pulmonary nodules - 3 closely associated noncalcified subpleural nodules are located in the right middle lobe anteriorly. Largest of these measures 1 cm series 3 image 10 and the other 2 measure 4 mm each, images 3 and 5. Repeat CT scan in 2023 showed stability of nodules over the 3 year period indicating benign process. Return in about 6 months (around 2024) for Physical.    SUBJECTIVE/OBJECTIVE:  HPI    Blood pressure at home - recently went up on amlodipine to 5mg  Bps have been better on the top number but bottom number still 90-95.      Review of Systems    Current Outpatient Medications on File Prior to Visit   Medication Sig Dispense Refill

## 2023-11-10 ENCOUNTER — TELEPHONE (OUTPATIENT)
Dept: INTERNAL MEDICINE CLINIC | Age: 38
End: 2023-11-10

## 2023-11-10 LAB
25(OH)D3 SERPL-MCNC: 34.3 NG/ML
ALBUMIN SERPL-MCNC: 4.6 G/DL (ref 3.4–5)
ALBUMIN/GLOB SERPL: 2 {RATIO} (ref 1.1–2.2)
ALP SERPL-CCNC: 54 U/L (ref 40–129)
ALT SERPL-CCNC: 10 U/L (ref 10–40)
ANION GAP SERPL CALCULATED.3IONS-SCNC: 11 MMOL/L (ref 3–16)
AST SERPL-CCNC: 17 U/L (ref 15–37)
BACTERIA URNS QL MICRO: NORMAL /HPF
BILIRUB SERPL-MCNC: 0.3 MG/DL (ref 0–1)
BILIRUB UR QL STRIP.AUTO: NEGATIVE
BUN SERPL-MCNC: 11 MG/DL (ref 7–20)
CALCIUM SERPL-MCNC: 9.7 MG/DL (ref 8.3–10.6)
CHLORIDE SERPL-SCNC: 99 MMOL/L (ref 99–110)
CLARITY UR: CLEAR
CO2 SERPL-SCNC: 27 MMOL/L (ref 21–32)
COLOR UR: YELLOW
CREAT SERPL-MCNC: 0.7 MG/DL (ref 0.6–1.1)
EPI CELLS #/AREA URNS AUTO: 0 /HPF (ref 0–5)
GFR SERPLBLD CREATININE-BSD FMLA CKD-EPI: >60 ML/MIN/{1.73_M2}
GLUCOSE SERPL-MCNC: 102 MG/DL (ref 70–99)
GLUCOSE UR STRIP.AUTO-MCNC: NEGATIVE MG/DL
HGB UR QL STRIP.AUTO: NEGATIVE
HYALINE CASTS #/AREA URNS AUTO: 0 /LPF (ref 0–8)
KETONES UR STRIP.AUTO-MCNC: NEGATIVE MG/DL
LEUKOCYTE ESTERASE UR QL STRIP.AUTO: NEGATIVE
NITRITE UR QL STRIP.AUTO: NEGATIVE
PH UR STRIP.AUTO: 8 [PH] (ref 5–8)
PHOSPHATE SERPL-MCNC: 2.8 MG/DL (ref 2.5–4.9)
POTASSIUM SERPL-SCNC: 3.5 MMOL/L (ref 3.5–5.1)
PROT SERPL-MCNC: 6.9 G/DL (ref 6.4–8.2)
PROT UR STRIP.AUTO-MCNC: NEGATIVE MG/DL
RBC CLUMPS #/AREA URNS AUTO: 0 /HPF (ref 0–4)
SODIUM SERPL-SCNC: 137 MMOL/L (ref 136–145)
SP GR UR STRIP.AUTO: 1.01 (ref 1–1.03)
UA DIPSTICK W REFLEX MICRO PNL UR: NORMAL
URN SPEC COLLECT METH UR: NORMAL
UROBILINOGEN UR STRIP-ACNC: 0.2 E.U./DL
WBC #/AREA URNS AUTO: 0 /HPF (ref 0–5)

## 2023-11-10 NOTE — TELEPHONE ENCOUNTER
[facility] calling to let Dr. Kristine Calix know that they had to reject the specimen as follows. SPECIMEN REJECTION: Patient Communication     Comment: Unable to perform testing; specimen possibly contaminated. To perform testing the specimen will need to be recollected.   Contamin

## 2023-11-13 LAB
ANNOTATION COMMENT IMP: NORMAL
METANEPHS SERPL-SCNC: <0.1 NMOL/L (ref 0–0.49)
NORMETANEPHRINE SERPL-SCNC: 0.39 NMOL/L (ref 0–0.89)

## 2023-11-14 LAB
CATECHOLS PLAS-IMP: NORMAL
DOPAMINE SERPL-MCNC: <130 PMOL/L
EPINEPH PLAS-MCNC: 70 PMOL/L
NOREPINEPH PLAS-MCNC: 1670 PMOL/L (ref 1050–4800)

## 2023-11-16 DIAGNOSIS — I10 PRIMARY HYPERTENSION: Primary | ICD-10-CM

## 2023-11-20 DIAGNOSIS — R03.0 ELEVATED BP WITHOUT DIAGNOSIS OF HYPERTENSION: ICD-10-CM

## 2023-11-20 RX ORDER — AMLODIPINE BESYLATE 2.5 MG/1
2.5 TABLET ORAL DAILY
Qty: 90 TABLET | Refills: 1 | Status: SHIPPED | OUTPATIENT
Start: 2023-11-20

## 2023-11-20 NOTE — TELEPHONE ENCOUNTER
Medication:   Requested Prescriptions     Pending Prescriptions Disp Refills    amLODIPine (NORVASC) 2.5 MG tablet 90 tablet 1     Sig: Take 1 tablet by mouth daily       Last Filled:  08/22/2023    Dose change to 5 mg, 11/09/2023   Tried to call the patient to see how her bp has been running, no answer left message to call the office.       Patient Phone Number: [phone] (home)     Last appt: 11/9/2023   Next appt: 5/7/2024    Last Lipid:   Lab Results   Component Value Date/Time    CHOL 198 05/23/2023 07:41 AM    TRIG 130 05/23/2023 07:41 AM    HDL 53 05/23/2023 07:41 AM    LDLCALC 119 05/23/2023 07:41 AM

## 2023-11-21 ENCOUNTER — OFFICE VISIT (OUTPATIENT)
Dept: GYNECOLOGY | Age: 38
End: 2023-11-21
Payer: COMMERCIAL

## 2023-11-21 VITALS
DIASTOLIC BLOOD PRESSURE: 70 MMHG | SYSTOLIC BLOOD PRESSURE: 118 MMHG | HEART RATE: 68 BPM | BODY MASS INDEX: 24.94 KG/M2 | HEIGHT: 60 IN | RESPIRATION RATE: 17 BRPM | WEIGHT: 127 LBS

## 2023-11-21 DIAGNOSIS — Z01.419 WELL WOMAN EXAM WITH ROUTINE GYNECOLOGICAL EXAM: Primary | ICD-10-CM

## 2023-11-21 PROCEDURE — 99395 PREV VISIT EST AGE 18-39: CPT | Performed by: OBSTETRICS & GYNECOLOGY

## 2023-11-21 PROCEDURE — 3074F SYST BP LT 130 MM HG: CPT | Performed by: OBSTETRICS & GYNECOLOGY

## 2023-11-21 PROCEDURE — 3078F DIAST BP <80 MM HG: CPT | Performed by: OBSTETRICS & GYNECOLOGY

## 2023-11-26 ASSESSMENT — ENCOUNTER SYMPTOMS
RESPIRATORY NEGATIVE: 1
GASTROINTESTINAL NEGATIVE: 1
ALLERGIC/IMMUNOLOGIC NEGATIVE: 1
EYES NEGATIVE: 1

## 2024-01-08 ENCOUNTER — PATIENT MESSAGE (OUTPATIENT)
Dept: INTERNAL MEDICINE CLINIC | Age: 39
End: 2024-01-08

## 2024-01-08 DIAGNOSIS — R03.0 ELEVATED BP WITHOUT DIAGNOSIS OF HYPERTENSION: ICD-10-CM

## 2024-01-08 RX ORDER — AMLODIPINE BESYLATE 5 MG/1
5 TABLET ORAL DAILY
Qty: 90 TABLET | Refills: 3 | Status: SHIPPED | OUTPATIENT
Start: 2024-01-08

## 2024-01-08 NOTE — TELEPHONE ENCOUNTER
From: Edel Mccallum  To: Dr. Charlotte Esteban  Sent: 1/8/2024 9:25 AM EST  Subject: new prescription for higher dosage    as we talked about I have been taking my amlodipne at 5 mg. can I get a new prescription for that dosage versus the 2.5 that I have?    My blood pressure has been around 115/94

## 2024-02-26 ENCOUNTER — E-VISIT (OUTPATIENT)
Dept: INTERNAL MEDICINE CLINIC | Age: 39
End: 2024-02-26
Payer: COMMERCIAL

## 2024-02-26 DIAGNOSIS — B96.89 ACUTE BACTERIAL SINUSITIS: Primary | ICD-10-CM

## 2024-02-26 DIAGNOSIS — J01.90 ACUTE BACTERIAL SINUSITIS: Primary | ICD-10-CM

## 2024-02-26 PROCEDURE — 99421 OL DIG E/M SVC 5-10 MIN: CPT | Performed by: INTERNAL MEDICINE

## 2024-02-26 RX ORDER — AMOXICILLIN AND CLAVULANATE POTASSIUM 875; 125 MG/1; MG/1
1 TABLET, FILM COATED ORAL 2 TIMES DAILY
Qty: 14 TABLET | Refills: 0 | Status: SHIPPED | OUTPATIENT
Start: 2024-02-26 | End: 2024-03-04

## 2024-02-26 ASSESSMENT — LIFESTYLE VARIABLES: SMOKING_STATUS: NO, I'VE NEVER SMOKED

## 2024-02-26 NOTE — PROGRESS NOTES
HPI: as per patient provided history  Exam: N/A (electronic visit)  ASSESSMENT/PLAN:  Diagnoses and all orders for this visit:    Acute bacterial sinusitis  -     amoxicillin-clavulanate (AUGMENTIN) 875-125 MG per tablet; Take 1 tablet by mouth 2 times daily for 7 days    Also recommend OTC mucinex.    Patient instructed to call the office if worsens, or fails to improve as anticipated.    5-10 minutes were spent on the digital evaluation and management of this patient.    Charlotte Esteban MD

## 2024-02-27 DIAGNOSIS — I10 PRIMARY HYPERTENSION: ICD-10-CM

## 2024-02-29 DIAGNOSIS — N29 NEPHROCALCINOSIS: ICD-10-CM

## 2024-02-29 DIAGNOSIS — E83.59 NEPHROCALCINOSIS: ICD-10-CM

## 2024-03-01 LAB
ALDOST SERPL-MCNC: 25 NG/DL
ALDOST/RENIN PLAS-RTO: 2 RATIO
RENIN PLAS-CCNC: 12.4 NG/ML/HR

## 2024-03-07 LAB
CALCIUM 24H UR-MCNC: 9.9 MG/DL
CALCIUM 24H UR-MRATE: 297 MG/D (ref 100–250)
CHLORIDE 24H UR-SCNC: <20 MMOL/L
CHLORIDE 24H UR-SRATE: <60 MMOL/D (ref 140–250)
CITRATE 24H UR-MCNC: 127 MG/L
CITRATE 24H UR-MRATE: 381 MG/D (ref 320–1240)
COLLECT DURATION TIME SPEC: 24 H
CREAT 24H UR-MCNC: 34 MG/DL
CREAT 24H UR-MRATE: 1020 MG/D (ref 700–1600)
MAGNESIUM 24H UR-MCNC: 4.9 MG/DL
MAGNESIUM 24H UR-MRATE: 147 MG/D (ref 12–199)
OXALATE 24H UR-MCNC: 5 MG/L
OXALATE 24H UR-MRATE: 15 MG/D (ref 13–40)
PH UR: 6.7 [PH] (ref 5–7.5)
PHOSPHATE 24H UR-MCNC: 29 MG/DL
PHOSPHATE 24H UR-MRATE: 870 MG/D (ref 400–1300)
POTASSIUM 24H UR-SCNC: 20 MMOL/L
POTASSIUM 24H UR-SRATE: 60 MMOL/D (ref 25–125)
SODIUM 24H UR-SCNC: <20 MMOL/L
SODIUM 24H UR-SRATE: <60 MMOL/D (ref 51–286)
SPECIMEN VOL ?TM UR: 3000 ML
URATE 24H UR-MCNC: 14.2 MG/DL
URATE 24H UR-MRATE: 426 MG/D (ref 250–750)

## 2024-05-13 ENCOUNTER — OFFICE VISIT (OUTPATIENT)
Dept: INTERNAL MEDICINE CLINIC | Age: 39
End: 2024-05-13
Payer: COMMERCIAL

## 2024-05-13 VITALS
HEART RATE: 65 BPM | BODY MASS INDEX: 25.15 KG/M2 | WEIGHT: 128.8 LBS | OXYGEN SATURATION: 98 % | DIASTOLIC BLOOD PRESSURE: 92 MMHG | SYSTOLIC BLOOD PRESSURE: 128 MMHG | TEMPERATURE: 98.2 F

## 2024-05-13 DIAGNOSIS — I10 PRIMARY HYPERTENSION: Primary | ICD-10-CM

## 2024-05-13 DIAGNOSIS — N29 NEPHROCALCINOSIS: ICD-10-CM

## 2024-05-13 DIAGNOSIS — R91.8 PULMONARY NODULES: ICD-10-CM

## 2024-05-13 DIAGNOSIS — E83.59 NEPHROCALCINOSIS: ICD-10-CM

## 2024-05-13 PROBLEM — D69.6 THROMBOCYTOPENIA, UNSPECIFIED (HCC): Status: RESOLVED | Noted: 2023-11-09 | Resolved: 2024-05-13

## 2024-05-13 PROCEDURE — 99214 OFFICE O/P EST MOD 30 MIN: CPT | Performed by: STUDENT IN AN ORGANIZED HEALTH CARE EDUCATION/TRAINING PROGRAM

## 2024-05-13 PROCEDURE — 3074F SYST BP LT 130 MM HG: CPT | Performed by: STUDENT IN AN ORGANIZED HEALTH CARE EDUCATION/TRAINING PROGRAM

## 2024-05-13 PROCEDURE — 3080F DIAST BP >= 90 MM HG: CPT | Performed by: STUDENT IN AN ORGANIZED HEALTH CARE EDUCATION/TRAINING PROGRAM

## 2024-05-13 ASSESSMENT — PATIENT HEALTH QUESTIONNAIRE - PHQ9
SUM OF ALL RESPONSES TO PHQ QUESTIONS 1-9: 0
SUM OF ALL RESPONSES TO PHQ QUESTIONS 1-9: 0
2. FEELING DOWN, DEPRESSED OR HOPELESS: NOT AT ALL
SUM OF ALL RESPONSES TO PHQ QUESTIONS 1-9: 0
1. LITTLE INTEREST OR PLEASURE IN DOING THINGS: NOT AT ALL
SUM OF ALL RESPONSES TO PHQ9 QUESTIONS 1 & 2: 0
SUM OF ALL RESPONSES TO PHQ QUESTIONS 1-9: 0

## 2024-05-13 ASSESSMENT — ENCOUNTER SYMPTOMS
SINUS PAIN: 0
SHORTNESS OF BREATH: 0
CHEST TIGHTNESS: 0
VOMITING: 0
DIARRHEA: 0
ABDOMINAL PAIN: 0
EYE PAIN: 0
CONSTIPATION: 0

## 2024-05-13 NOTE — PROGRESS NOTES
associated noncalcified subpleural nodules are located in the right middle lobe anteriorly. Largest of these measures 1 cm series 3 image 10 and the other 2 measure 4 mm each, images 3 and 5. Repeat CT scan in 9/2023 showed stability of nodules over the 3 year period indicating benign process.   -No need for regular CT surveillance       Orders Placed This Encounter    [facility](Epic) - Micki Avelar MD, Nephrology, [facility]     Referral Priority:   Routine     Referral Type:   Eval and Treat     Referral Reason:   Specialty Services Required     Referred to Provider:   Micki Avelar MD     Requested Specialty:   Nephrology     Number of Visits Requested:   1        No follow-ups on file.

## 2024-05-13 NOTE — ASSESSMENT & PLAN NOTE
Seen incidentally on CT of chest. No hx of kidney stones. Prior calcium levels have been normal.  -Referred to nephrology

## 2024-05-13 NOTE — ASSESSMENT & PLAN NOTE
CT 9/3/2020 incidentally found multiple pulmonary nodules - 3 closely associated noncalcified subpleural nodules are located in the right middle lobe anteriorly. Largest of these measures 1 cm series 3 image 10 and the other 2 measure 4 mm each, images 3 and 5. Repeat CT scan in 9/2023 showed stability of nodules over the 3 year period indicating benign process.   -No need for regular CT surveillance

## 2024-05-13 NOTE — ASSESSMENT & PLAN NOTE
This problem is not adequately controlled on current treatment.  Concern for possible secondary hypertension.  She has been somewhat resistant to treatment.  Diastolic blood pressure remains elevated, pulse pressure decreased now that she has been on antihypertensive treatment.  Asymptomatic.  Elevated aldosterone, normal renin on recent labs.  Ratio was normal.  - Referred to nephrology for workup and optimization of regimen  - Continue amlodipine 5 mg daily

## 2024-12-03 ENCOUNTER — OFFICE VISIT (OUTPATIENT)
Dept: GYNECOLOGY | Age: 39
End: 2024-12-03
Payer: COMMERCIAL

## 2024-12-03 VITALS
WEIGHT: 130.73 LBS | HEIGHT: 60 IN | SYSTOLIC BLOOD PRESSURE: 130 MMHG | RESPIRATION RATE: 17 BRPM | OXYGEN SATURATION: 98 % | DIASTOLIC BLOOD PRESSURE: 80 MMHG | HEART RATE: 78 BPM | BODY MASS INDEX: 25.67 KG/M2

## 2024-12-03 DIAGNOSIS — Z01.419 WELL WOMAN EXAM WITH ROUTINE GYNECOLOGICAL EXAM: Primary | ICD-10-CM

## 2024-12-03 PROCEDURE — 99395 PREV VISIT EST AGE 18-39: CPT | Performed by: OBSTETRICS & GYNECOLOGY

## 2024-12-03 PROCEDURE — 3075F SYST BP GE 130 - 139MM HG: CPT | Performed by: OBSTETRICS & GYNECOLOGY

## 2024-12-03 PROCEDURE — 3079F DIAST BP 80-89 MM HG: CPT | Performed by: OBSTETRICS & GYNECOLOGY

## 2024-12-03 ASSESSMENT — ENCOUNTER SYMPTOMS
EYES NEGATIVE: 1
RESPIRATORY NEGATIVE: 1
ALLERGIC/IMMUNOLOGIC NEGATIVE: 1
GASTROINTESTINAL NEGATIVE: 1

## 2024-12-04 NOTE — PROGRESS NOTES
Overall Financial Resource Strain (CARDIA)     Difficulty of Paying Living Expenses: Not hard at all   Food Insecurity: Not on file (5/15/2023)   Transportation Needs: Unknown (5/15/2023)    PRAPARE - Transportation     Lack of Transportation (Medical): Not on file     Lack of Transportation (Non-Medical): No   Physical Activity: Not on file   Stress: Not on file   Social Connections: Not on file   Intimate Partner Violence: Not on file   Housing Stability: Unknown (5/15/2023)    Housing Stability Vital Sign     Unable to Pay for Housing in the Last Year: Not on file     Number of Places Lived in the Last Year: Not on file     Unstable Housing in the Last Year: No     No Known Allergies  Outpatient Medications Marked as Taking for the 12/3/24 encounter (Office Visit) with Brisa Martin MD   Medication Sig Dispense Refill    amLODIPine (NORVASC) 10 MG tablet Take 1 tablet by mouth daily 90 tablet 1    hydroCHLOROthiazide 12.5 MG capsule Take 1 capsule by mouth every morning 90 capsule 1     Family History   Problem Relation Age of Onset    Cancer Mother         lymphoma    Rectal Cancer Brother     Hypertension Maternal Grandmother     Diabetes Maternal Grandmother     Hypertension Paternal Grandmother     Diabetes Paternal Grandmother     Rectal Cancer Paternal Grandfather      /80 (Site: Right Upper Arm, Position: Sitting, Cuff Size: Large Adult)   Pulse 78   Resp 17   Ht 1.524 m (5')   Wt 59.3 kg (130 lb 11.7 oz)   LMP 11/19/2024 (Approximate)   SpO2 98%   BMI 25.53 kg/m²          Objective   Physical Exam  Constitutional:       Appearance: Normal appearance. She is well-developed and normal weight.   HENT:      Head: Normocephalic and atraumatic.      Nose: Nose normal.      Mouth/Throat:      Mouth: Mucous membranes are moist.      Pharynx: Oropharynx is clear.   Eyes:      Extraocular Movements: Extraocular movements intact.   Neck:      Thyroid: No thyromegaly.   Cardiovascular:      Rate and

## 2025-05-30 ENCOUNTER — E-VISIT (OUTPATIENT)
Dept: PRIMARY CARE CLINIC | Age: 40
End: 2025-05-30

## 2025-05-30 DIAGNOSIS — N89.8 VAGINAL DISCHARGE: Primary | ICD-10-CM

## 2025-05-30 NOTE — PROGRESS NOTES
Edel Mccallum (1985) initiated an asynchronous digital communication through [email or web address].    HPI: per patient questionnaire     Exam: not applicable    Diagnoses and all orders for this visit:  Diagnoses and all orders for this visit:    Vaginal discharge    Patient answered no to most of the questions except for vaginal itching and discharge.  Recommend she be seen in person for evaluation. Like vaginitis and not uti       18 minutes were spent on the digital evaluation and management of this patient.    Jacklyn Souza, APRN - CNP